# Patient Record
Sex: MALE | Race: WHITE | HISPANIC OR LATINO | Employment: FULL TIME | URBAN - METROPOLITAN AREA
[De-identification: names, ages, dates, MRNs, and addresses within clinical notes are randomized per-mention and may not be internally consistent; named-entity substitution may affect disease eponyms.]

---

## 2017-02-06 ENCOUNTER — OFFICE VISIT (OUTPATIENT)
Dept: URGENT CARE | Facility: CLINIC | Age: 47
End: 2017-02-06
Payer: COMMERCIAL

## 2017-02-06 VITALS
TEMPERATURE: 97.5 F | SYSTOLIC BLOOD PRESSURE: 120 MMHG | RESPIRATION RATE: 20 BRPM | WEIGHT: 178 LBS | OXYGEN SATURATION: 96 % | BODY MASS INDEX: 26.27 KG/M2 | HEART RATE: 78 BPM | DIASTOLIC BLOOD PRESSURE: 80 MMHG

## 2017-02-06 DIAGNOSIS — M54.50 LUMBOSACRAL PAIN: Primary | ICD-10-CM

## 2017-02-06 PROCEDURE — 99214 OFFICE O/P EST MOD 30 MIN: CPT | Performed by: PHYSICIAN ASSISTANT

## 2017-02-06 RX ORDER — TRAMADOL HYDROCHLORIDE 50 MG/1
50 TABLET ORAL EVERY 4 HOURS PRN
Qty: 30 TAB | Refills: 0 | Status: SHIPPED | OUTPATIENT
Start: 2017-02-06 | End: 2017-08-22

## 2017-02-06 RX ORDER — METHYLPREDNISOLONE 4 MG/1
TABLET ORAL
Qty: 21 TAB | Refills: 0 | Status: SHIPPED | OUTPATIENT
Start: 2017-02-06 | End: 2017-02-12

## 2017-02-06 RX ORDER — CYCLOBENZAPRINE HCL 5 MG
5-10 TABLET ORAL 3 TIMES DAILY PRN
Qty: 30 TAB | Refills: 0 | Status: SHIPPED | OUTPATIENT
Start: 2017-02-06 | End: 2017-08-22

## 2017-02-06 RX ORDER — IBUPROFEN 200 MG
200 TABLET ORAL EVERY 6 HOURS PRN
COMMUNITY
End: 2017-08-22

## 2017-02-06 ASSESSMENT — ENCOUNTER SYMPTOMS: BACK PAIN: 1

## 2017-02-06 NOTE — MR AVS SNAPSHOT
Darrell Latham   2017 11:15 AM   Office Visit   MRN: [id number]    Department:  [facility]   Dept Phone:  [phone]    Description:  Male : 1970   Provider:  Chang Ramírez PA-C           Reason for Visit     Back Pain Since yesterday hurt lower back .      Allergies as of 2017     Allergen Noted Reactions    Latex 2016       rash    Seasonal 2016   Itching    Sinus pressure, itchy eyes, itchy throat      You were diagnosed with     Lumbosacral pain   [395146]  -  Primary       Vital Signs     Blood Pressure Pulse Temperature Respirations Weight Oxygen Saturation    120/80 mmHg 78 36.4 °C (97.5 °F) 20 80.74 kg (178 lb) 96%    Smoking Status                   Never Smoker            Basic Information     Date Of Birth Sex Race Ethnicity Preferred Language    1970 Male White Non- English      Problem List              ICD-10-CM Priority Class Noted - Resolved    Hyponatremia E87.1   2016 - Present    Chest pain R07.9   2016 - Present      Health Maintenance        Date Due Completion Dates    IMM DTaP/Tdap/Td Vaccine (1 - Tdap) 2010    IMM INFLUENZA (1) 2016 ---            Current Immunizations     TD Vaccine 2010      Below and/or attached are the medications your provider expects you to take. Review all of your home medications and newly ordered medications with your provider and/or pharmacist. Follow medication instructions as directed by your provider and/or pharmacist. Please keep your medication list with you and share with your provider. Update the information when medications are discontinued, doses are changed, or new medications (including over-the-counter products) are added; and carry medication information at all times in the event of emergency situations     Allergies:  LATEX - (reactions not documented)     SEASONAL - Itching               Medications  Valid as of: 2017 - 11:40 AM    Generic Name  Brand Name Tablet Size Instructions for use    Acetaminophen (Tab) TYLENOL 500 MG Take 1,000 mg by mouth every 6 hours as needed for Mild Pain.        Cyclobenzaprine HCl (Tab) FLEXERIL 5 MG Take 1-2 Tabs by mouth 3 times a day as needed.        Fluticasone Propionate (Suspension) FLONASE 50 MCG/ACT Spray 1 Spray in nose every day. Each Nostril        Ibuprofen (Tab) MOTRIN 200 MG Take 200 mg by mouth every 6 hours as needed.        MethylPREDNISolone (Tablet Therapy Pack) MEDROL DOSEPAK 4 MG Use as directed        Multiple Vitamins-Minerals (Tab) THERAGRAN-M  Take 1 Tab by mouth every day.        Pseudoephedrine HCl   Take 1 Tab by mouth every day.        Temazepam (Cap) RESTORIL 7.5 MG Take 7.5 mg by mouth at bedtime as needed for Sleep.        TraMADol HCl (Tab) ULTRAM 50 MG Take 1 Tab by mouth every four hours as needed.        .                 Medicines prescribed today were sent to:     [facility][id number] - [place] [address] [facility]    [address] [place] [address]    Phone: [phone] Fax: [phone]    Open 24 Hours?: No      Medication refill instructions:       If your prescription bottle indicates you have medication refills left, it is not necessary to call your provider’s office. Please contact your pharmacy and they will refill your medication.    If your prescription bottle indicates you do not have any refills left, you may request refills at any time through one of the following ways: The online [employer] system (except Urgent Care), by calling your provider’s office, or by asking your pharmacy to contact your provider’s office with a refill request. Medication refills are processed only during regular business hours and may not be available until the next business day. Your provider may request additional information or to have a follow-up visit with you prior to refilling your medication.   *Please Note: Medication refills are assigned a new Rx number when refilled electronically. Your  pharmacy may indicate that no refills were authorized even though a new prescription for the same medication is available at the pharmacy. Please request the medicine by name with the pharmacy before contacting your provider for a refill.        Instructions    Lumbosacral Strain  Lumbosacral strain is a strain of any of the parts that make up your lumbosacral vertebrae. Your lumbosacral vertebrae are the bones that make up the lower third of your backbone. Your lumbosacral vertebrae are held together by muscles and tough, fibrous tissue (ligaments).   CAUSES   A sudden blow to your back can cause lumbosacral strain. Also, anything that causes an excessive stretch of the muscles in the low back can cause this strain. This is typically seen when people exert themselves strenuously, fall, lift heavy objects, bend, or crouch repeatedly.  RISK FACTORS  · Physically demanding work.  · Participation in pushing or pulling sports or sports that require a sudden twist of the back (tennis, golf, baseball).  · Weight lifting.  · Excessive lower back curvature.  · Forward-tilted pelvis.  · Weak back or abdominal muscles or both.  · Tight hamstrings.  SIGNS AND SYMPTOMS   Lumbosacral strain may cause pain in the area of your injury or pain that moves (radiates) down your leg.   DIAGNOSIS  Your health care provider can often diagnose lumbosacral strain through a physical exam. In some cases, you may need tests such as X-ray exams.   TREATMENT   Treatment for your lower back injury depends on many factors that your clinician will have to evaluate. However, most treatment will include the use of anti-inflammatory medicines.  HOME CARE INSTRUCTIONS   · Avoid hard physical activities (tennis, racquetball, waterskiing) if you are not in proper physical condition for it. This may aggravate or create problems.  · If you have a back problem, avoid sports requiring sudden body movements. Swimming and walking are generally safer  activities.  · Maintain good posture.  · Maintain a healthy weight.  · For acute conditions, you may put ice on the injured area.  ¨ Put ice in a plastic bag.  ¨ Place a towel between your skin and the bag.  ¨ Leave the ice on for 20 minutes, 2-3 times a day.  · When the low back starts healing, stretching and strengthening exercises may be recommended.  SEEK MEDICAL CARE IF:  · Your back pain is getting worse.  · You experience severe back pain not relieved with medicines.  SEEK IMMEDIATE MEDICAL CARE IF:   · You have numbness, tingling, weakness, or problems with the use of your arms or legs.  · There is a change in bowel or bladder control.  · You have increasing pain in any area of the body, including your belly (abdomen).  · You notice shortness of breath, dizziness, or feel faint.  · You feel sick to your stomach (nauseous), are throwing up (vomiting), or become sweaty.  · You notice discoloration of your toes or legs, or your feet get very cold.  MAKE SURE YOU:   · Understand these instructions.  · Will watch your condition.  · Will get help right away if you are not doing well or get worse.     This information is not intended to replace advice given to you by your health care provider. Make sure you discuss any questions you have with your health care provider.     Document Released: 09/27/2006 Document Revised: 01/08/2016 Document Reviewed: 08/06/2014  [employer] Interactive Patient Education ©2016 Elsevier Inc.            Prifloat Access Code: [email or web address]  Expires: 3/8/2017 11:30 AM    Your email address is not on file at [email or web address].  Email Addresses are required for you to sign up for Prifloat, please contact [phone] to verify your personal information and to provide your email address prior to attempting to register for Prifloat.    Darrell Latham  [address] [place] [address]    Prifloat  A secure, online tool to manage your health information     [employer]’s Prifloat® is a secure,  online tool that connects you to your personalized health information from the privacy of your home -- day or night - making it very easy for you to manage your healthcare. Once the activation process is completed, you can even access your medical information using the Ringostat isiah, which is available for free in the Apple Isiah store or Google Play store.     To learn more about Ringostat, visit [email or web address]    There are two levels of access available (as shown below):   My Chart Features  [facility]  Specialists [facility]  Urgent  Care Non-[facility] Primary Care Doctor   Email your healthcare team securely and privately 24/7 X X X    Manage appointments: schedule your next appointment; view details of past/upcoming appointments X      Request prescription refills. X      View recent personal medical records, including lab and immunizations X X X X   View health record, including health history, allergies, medications X X X X   Read reports about your outpatient visits, procedures, consult and ER notes X X X X   See your discharge summary, which is a recap of your hospital and/or ER visit that includes your diagnosis, lab results, and care plan X X  X     How to register for Ringostat:  Once your e-mail address has been verified, follow the following steps to sign up for Ringostat.     1. Go to  [email or web address]  2. Click on the Sign Up Now box, which takes you to the New Member Sign Up page. You will need to provide the following information:  a. Enter your Ringostat Access Code exactly as it appears at the top of this page. (You will not need to use this code after you’ve completed the sign-up process. If you do not sign up before the expiration date, you must request a new code.)   b. Enter your date of birth.   c. Enter your home email address.   d. Click Submit, and follow the next screen’s instructions.  3. Create a Ringostat ID. This will be your Ringostat login ID and cannot be  changed, so think of one that is secure and easy to remember.  4. Create a StarCard password. You can change your password at any time.  5. Enter your Password Reset Question and Answer. This can be used at a later time if you forget your password.   6. Enter your e-mail address. This allows you to receive e-mail notifications when new information is available in StarCard.  7. Click Sign Up. You can now view your health information.    For assistance activating your StarCard account, call [phone]

## 2017-02-06 NOTE — PROGRESS NOTES
"Subjective:      PT is a 46 y.o. male who presents with Back Pain            Back Pain  This is a new problem. The current episode started yesterday. The problem occurs constantly. The problem has been gradually worsening since onset. The pain is present in the lumbar spine. The quality of the pain is described as aching, burning and shooting. The pain does not radiate. The pain is at a severity of 8/10. The pain is moderate. The pain is the same all the time. The symptoms are aggravated by position. Stiffness is present all day. He has tried home exercises, heat, ice and NSAIDs for the symptoms. The treatment provided no relief.   PT states he was vacuuming before the Super Bowl yesterday and felt a \"pull\" in his right lumbar region with instant pain. PT denies issues with bowel or bladder incontinence. Pt has not taken any Rx medications for this condition. Pt states the pain is a 7/10, aching in nature and worse at night. Pt denies CP, SOB, NVD, paresthesias, headaches, dizziness, change in vision, hives, or other joint pain. The pt's medication list, problem list, and allergies have been evaluated and reviewed during today's visit.    PMH:  Negative per pt.      PSH:  Negative per pt.      Fam Hx:  Father with hx of HTN      Soc HX:  Social History     Social History   • Marital Status:      Spouse Name: N/A   • Number of Children: N/A   • Years of Education: N/A     Occupational History   • Not on file.     Social History Main Topics   • Smoking status: Never Smoker    • Smokeless tobacco: Not on file   • Alcohol Use: No   • Drug Use: No   • Sexual Activity: Not on file     Other Topics Concern   • Not on file     Social History Narrative         Medications:    Current outpatient prescriptions:   •  ibuprofen (MOTRIN) 200 MG Tab, Take 200 mg by mouth every 6 hours as needed., Disp: , Rfl:   •  tramadol (ULTRAM) 50 MG Tab, Take 1 Tab by mouth every four hours as needed., Disp: 30 Tab, Rfl: 0  •  " cyclobenzaprine (FLEXERIL) 5 MG tablet, Take 1-2 Tabs by mouth 3 times a day as needed., Disp: 30 Tab, Rfl: 0  •  MethylPREDNISolone (MEDROL DOSEPAK) 4 MG Tablet Therapy Pack, Use as directed, Disp: 21 Tab, Rfl: 0  •  Pseudoephedrine HCl (SUDAFED 24 HOUR PO), Take 1 Tab by mouth every day., Disp: , Rfl:   •  therapeutic multivitamin-minerals (THERAGRAN-M) Tab, Take 1 Tab by mouth every day., Disp: , Rfl:   •  acetaminophen (TYLENOL) 500 MG Tab, Take 1,000 mg by mouth every 6 hours as needed for Mild Pain., Disp: , Rfl:   •  temazepam (RESTORIL) 7.5 MG capsule, Take 7.5 mg by mouth at bedtime as needed for Sleep., Disp: , Rfl:   •  fluticasone (FLONASE) 50 MCG/ACT nasal spray, Spray 1 Spray in nose every day. Each Nostril, Disp: , Rfl:       Allergies:  Latex and Seasonal        Review of Systems   Musculoskeletal: Positive for back pain.   Constitutional: Negative for fever, chills and malaise/fatigue.   HENT: Negative for congestion and sore throat.    Eyes: Negative for blurred vision, double vision and photophobia.   Respiratory: Negative for cough and shortness of breath.    Cardiovascular: Negative for chest pain and palpitations.   Gastrointestinal: Negative for heartburn, nausea, vomiting, abdominal pain, diarrhea and constipation.   Genitourinary: Negative for dysuria and flank pain.   Skin: Negative for itching and rash.   Neurological: Negative for dizziness, tingling and headaches.   Endo/Heme/Allergies: Does not bruise/bleed easily.   Psychiatric/Behavioral: Negative for depression. The patient is not nervous/anxious.             Objective:     /80 mmHg  Pulse 78  Temp(Src) 36.4 °C (97.5 °F)  Resp 20  Wt 80.74 kg (178 lb)  SpO2 96%     Physical Exam   Musculoskeletal:        Lumbar back: He exhibits decreased range of motion, tenderness, pain and spasm. He exhibits no bony tenderness, no swelling, no edema, no deformity, no laceration and normal pulse.        Back:          Constitutional: PT  is oriented to person, place, and time. PT appears well-developed and well-nourished. No distress.   HENT:   Head: Normocephalic and atraumatic.   Mouth/Throat: Oropharynx is clear and moist. No oropharyngeal exudate.   Eyes: Conjunctivae normal and EOM are normal. Pupils are equal, round, and reactive to light.   Neck: Normal range of motion. Neck supple. No thyromegaly present.   Cardiovascular: Normal rate, regular rhythm, normal heart sounds and intact distal pulses.  Exam reveals no gallop and no friction rub.    No murmur heard.  Pulmonary/Chest: Effort normal and breath sounds normal. No respiratory distress. PT has no wheezes. PT has no rales. Pt exhibits no tenderness.   Abdominal: Soft. Bowel sounds are normal. PT exhibits no distension and no mass. There is no tenderness. There is no rebound and no guarding.   Neurological: PT is alert and oriented to person, place, and time. PT has normal reflexes. No cranial nerve deficit.   Skin: Skin is warm and dry. No rash noted. PT is not diaphoretic. No erythema.       Psychiatric: PT has a normal mood and affect. PT behavior is normal. Judgment and thought content normal.          Assessment/Plan:     1. Lumbosacral pain    - tramadol (ULTRAM) 50 MG Tab; Take 1 Tab by mouth every four hours as needed.  Dispense: 30 Tab; Refill: 0  - cyclobenzaprine (FLEXERIL) 5 MG tablet; Take 1-2 Tabs by mouth 3 times a day as needed.  Dispense: 30 Tab; Refill: 0  - MethylPREDNISolone (MEDROL DOSEPAK) 4 MG Tablet Therapy Pack; Use as directed  Dispense: 21 Tab; Refill: 0    [facility] Aware web site evaluation: I have obtained and reviewed patient utilization report from [facility] pharmacy database prior to writing prescription for controlled substance II, III or IV per [place] bill . Based on the report and my clinical assessment the prescription is medically necessary.   NSAIDs for pain 1-5, Ultram for pain 6-10 or to help get to sleep.  RICE therapy discussed  Gentle  ROM exercises discussed  WBAT BLE  Ice/heat therapy discussed  Rest, fluids encouraged.  AVS with medical info given.  Pt was in full understanding and agreement with the plan.  Follow-up as needed if symptoms worsen or fail to improve.

## 2017-02-06 NOTE — PATIENT INSTRUCTIONS
Lumbosacral Strain  Lumbosacral strain is a strain of any of the parts that make up your lumbosacral vertebrae. Your lumbosacral vertebrae are the bones that make up the lower third of your backbone. Your lumbosacral vertebrae are held together by muscles and tough, fibrous tissue (ligaments).   CAUSES   A sudden blow to your back can cause lumbosacral strain. Also, anything that causes an excessive stretch of the muscles in the low back can cause this strain. This is typically seen when people exert themselves strenuously, fall, lift heavy objects, bend, or crouch repeatedly.  RISK FACTORS  · Physically demanding work.  · Participation in pushing or pulling sports or sports that require a sudden twist of the back (tennis, golf, baseball).  · Weight lifting.  · Excessive lower back curvature.  · Forward-tilted pelvis.  · Weak back or abdominal muscles or both.  · Tight hamstrings.  SIGNS AND SYMPTOMS   Lumbosacral strain may cause pain in the area of your injury or pain that moves (radiates) down your leg.   DIAGNOSIS  Your health care provider can often diagnose lumbosacral strain through a physical exam. In some cases, you may need tests such as X-ray exams.   TREATMENT   Treatment for your lower back injury depends on many factors that your clinician will have to evaluate. However, most treatment will include the use of anti-inflammatory medicines.  HOME CARE INSTRUCTIONS   · Avoid hard physical activities (tennis, racquetball, waterskiing) if you are not in proper physical condition for it. This may aggravate or create problems.  · If you have a back problem, avoid sports requiring sudden body movements. Swimming and walking are generally safer activities.  · Maintain good posture.  · Maintain a healthy weight.  · For acute conditions, you may put ice on the injured area.  ¨ Put ice in a plastic bag.  ¨ Place a towel between your skin and the bag.  ¨ Leave the ice on for 20 minutes, 2-3 times a day.  · When the  low back starts healing, stretching and strengthening exercises may be recommended.  SEEK MEDICAL CARE IF:  · Your back pain is getting worse.  · You experience severe back pain not relieved with medicines.  SEEK IMMEDIATE MEDICAL CARE IF:   · You have numbness, tingling, weakness, or problems with the use of your arms or legs.  · There is a change in bowel or bladder control.  · You have increasing pain in any area of the body, including your belly (abdomen).  · You notice shortness of breath, dizziness, or feel faint.  · You feel sick to your stomach (nauseous), are throwing up (vomiting), or become sweaty.  · You notice discoloration of your toes or legs, or your feet get very cold.  MAKE SURE YOU:   · Understand these instructions.  · Will watch your condition.  · Will get help right away if you are not doing well or get worse.     This information is not intended to replace advice given to you by your health care provider. Make sure you discuss any questions you have with your health care provider.     Document Released: 09/27/2006 Document Revised: 01/08/2016 Document Reviewed: 08/06/2014  [employer] Interactive Patient Education ©2016 [employer].

## 2017-08-22 ENCOUNTER — HOSPITAL ENCOUNTER (EMERGENCY)
Facility: MEDICAL CENTER | Age: 47
End: 2017-08-22
Attending: EMERGENCY MEDICINE
Payer: COMMERCIAL

## 2017-08-22 ENCOUNTER — APPOINTMENT (OUTPATIENT)
Dept: RADIOLOGY | Facility: MEDICAL CENTER | Age: 47
End: 2017-08-22
Attending: EMERGENCY MEDICINE
Payer: COMMERCIAL

## 2017-08-22 VITALS
DIASTOLIC BLOOD PRESSURE: 112 MMHG | RESPIRATION RATE: 13 BRPM | WEIGHT: 168.21 LBS | HEART RATE: 57 BPM | BODY MASS INDEX: 24.91 KG/M2 | TEMPERATURE: 98.5 F | HEIGHT: 69 IN | OXYGEN SATURATION: 94 % | SYSTOLIC BLOOD PRESSURE: 149 MMHG

## 2017-08-22 DIAGNOSIS — R10.13 EPIGASTRIC PAIN: ICD-10-CM

## 2017-08-22 LAB
ALBUMIN SERPL BCP-MCNC: 4.4 G/DL (ref 3.2–4.9)
ALBUMIN/GLOB SERPL: 1.8 G/DL
ALP SERPL-CCNC: 81 U/L (ref 30–99)
ALT SERPL-CCNC: 23 U/L (ref 2–50)
ANION GAP SERPL CALC-SCNC: 7 MMOL/L (ref 0–11.9)
AST SERPL-CCNC: 25 U/L (ref 12–45)
BASOPHILS # BLD AUTO: 0.5 % (ref 0–1.8)
BASOPHILS # BLD: 0.03 K/UL (ref 0–0.12)
BILIRUB SERPL-MCNC: 1.5 MG/DL (ref 0.1–1.5)
BUN SERPL-MCNC: 10 MG/DL (ref 8–22)
CALCIUM SERPL-MCNC: 9.4 MG/DL (ref 8.4–10.2)
CHLORIDE SERPL-SCNC: 99 MMOL/L (ref 96–112)
CO2 SERPL-SCNC: 26 MMOL/L (ref 20–33)
CREAT SERPL-MCNC: 0.97 MG/DL (ref 0.5–1.4)
DEPRECATED D DIMER PPP IA-ACNC: 222 NG/ML(D-DU)
EKG IMPRESSION: NORMAL
EOSINOPHIL # BLD AUTO: 0.02 K/UL (ref 0–0.51)
EOSINOPHIL NFR BLD: 0.3 % (ref 0–6.9)
ERYTHROCYTE [DISTWIDTH] IN BLOOD BY AUTOMATED COUNT: 40.8 FL (ref 35.9–50)
GFR SERPL CREATININE-BSD FRML MDRD: >60 ML/MIN/1.73 M 2
GLOBULIN SER CALC-MCNC: 2.4 G/DL (ref 1.9–3.5)
GLUCOSE SERPL-MCNC: 112 MG/DL (ref 65–99)
HCT VFR BLD AUTO: 44.8 % (ref 42–52)
HGB BLD-MCNC: 16.2 G/DL (ref 14–18)
IMM GRANULOCYTES # BLD AUTO: 0.02 K/UL (ref 0–0.11)
IMM GRANULOCYTES NFR BLD AUTO: 0.3 % (ref 0–0.9)
LIPASE SERPL-CCNC: 20 U/L (ref 7–58)
LYMPHOCYTES # BLD AUTO: 1.35 K/UL (ref 1–4.8)
LYMPHOCYTES NFR BLD: 22.2 % (ref 22–41)
MCH RBC QN AUTO: 31.9 PG (ref 27–33)
MCHC RBC AUTO-ENTMCNC: 36.2 G/DL (ref 33.7–35.3)
MCV RBC AUTO: 88.2 FL (ref 81.4–97.8)
MONOCYTES # BLD AUTO: 0.41 K/UL (ref 0–0.85)
MONOCYTES NFR BLD AUTO: 6.7 % (ref 0–13.4)
NEUTROPHILS # BLD AUTO: 4.26 K/UL (ref 1.82–7.42)
NEUTROPHILS NFR BLD: 70 % (ref 44–72)
NRBC # BLD AUTO: 0 K/UL
NRBC BLD AUTO-RTO: 0 /100 WBC
PLATELET # BLD AUTO: 219 K/UL (ref 164–446)
PMV BLD AUTO: 8.7 FL (ref 9–12.9)
POTASSIUM SERPL-SCNC: 3.6 MMOL/L (ref 3.6–5.5)
PROT SERPL-MCNC: 6.8 G/DL (ref 6–8.2)
RBC # BLD AUTO: 5.08 M/UL (ref 4.7–6.1)
SODIUM SERPL-SCNC: 132 MMOL/L (ref 135–145)
TROPONIN I SERPL-MCNC: <0.02 NG/ML (ref 0–0.04)
WBC # BLD AUTO: 6.1 K/UL (ref 4.8–10.8)

## 2017-08-22 PROCEDURE — 80053 COMPREHEN METABOLIC PANEL: CPT

## 2017-08-22 PROCEDURE — 71010 DX-CHEST-PORTABLE (1 VIEW): CPT

## 2017-08-22 PROCEDURE — 700102 HCHG RX REV CODE 250 W/ 637 OVERRIDE(OP): Performed by: EMERGENCY MEDICINE

## 2017-08-22 PROCEDURE — 84484 ASSAY OF TROPONIN QUANT: CPT

## 2017-08-22 PROCEDURE — A9270 NON-COVERED ITEM OR SERVICE: HCPCS | Performed by: EMERGENCY MEDICINE

## 2017-08-22 PROCEDURE — 85025 COMPLETE CBC W/AUTO DIFF WBC: CPT

## 2017-08-22 PROCEDURE — 76705 ECHO EXAM OF ABDOMEN: CPT

## 2017-08-22 PROCEDURE — 99285 EMERGENCY DEPT VISIT HI MDM: CPT

## 2017-08-22 PROCEDURE — 93005 ELECTROCARDIOGRAM TRACING: CPT | Performed by: EMERGENCY MEDICINE

## 2017-08-22 PROCEDURE — 85379 FIBRIN DEGRADATION QUANT: CPT

## 2017-08-22 PROCEDURE — 36415 COLL VENOUS BLD VENIPUNCTURE: CPT

## 2017-08-22 PROCEDURE — 83690 ASSAY OF LIPASE: CPT

## 2017-08-22 RX ADMIN — LIDOCAINE HYDROCHLORIDE 30 ML: 20 SOLUTION OROPHARYNGEAL at 08:54

## 2017-08-22 ASSESSMENT — PAIN SCALES - GENERAL: PAINLEVEL_OUTOF10: 6

## 2017-08-22 NOTE — ED AVS SNAPSHOT
Pressable Access Code: [email or web address]  Expires: 9/21/2017 11:05 AM    Your email address is not on file at [facility].  Email Addresses are required for you to sign up for Pressable, please contact [phone] to verify your personal information and to provide your email address prior to attempting to register for Pressable.    Darrell Latham  [address] [place] [address]    Pressable  A secure, online tool to manage your health information     [employer]’s Pressable® is a secure, online tool that connects you to your personalized health information from the privacy of your home -- day or night - making it very easy for you to manage your healthcare. Once the activation process is completed, you can even access your medical information using the Pressable isiah, which is available for free in the Apple Isiah store or Google Play store.     To learn more about Pressable, visit [email or web address]    There are two levels of access available (as shown below):   My Chart Features  [facility] Primary Care Doctor [facility]  Specialists [facility]  Urgent  Care Non-[facility] Primary Care Doctor   Email your healthcare team securely and privately 24/7 X X X    Manage appointments: schedule your next appointment; view details of past/upcoming appointments X      Request prescription refills. X      View recent personal medical records, including lab and immunizations X X X X   View health record, including health history, allergies, medications X X X X   Read reports about your outpatient visits, procedures, consult and ER notes X X X X   See your discharge summary, which is a recap of your hospital and/or ER visit that includes your diagnosis, lab results, and care plan X X  X     How to register for Pressable:  Once your e-mail address has been verified, follow the following steps to sign up for Pressable.     1. Go to  [email or web address]  2. Click on the Sign Up Now box, which takes you to the New Member Sign Up page. You will  need to provide the following information:  a. Enter your [employer] Access Code exactly as it appears at the top of this page. (You will not need to use this code after you’ve completed the sign-up process. If you do not sign up before the expiration date, you must request a new code.)   b. Enter your date of birth.   c. Enter your home email address.   d. Click Submit, and follow the next screen’s instructions.  3. Create a Websenset ID. This will be your [employer] login ID and cannot be changed, so think of one that is secure and easy to remember.  4. Create a [employer] password. You can change your password at any time.  5. Enter your Password Reset Question and Answer. This can be used at a later time if you forget your password.   6. Enter your e-mail address. This allows you to receive e-mail notifications when new information is available in [employer].  7. Click Sign Up. You can now view your health information.    For assistance activating your [employer] account, call [phone]

## 2017-08-22 NOTE — ED PROVIDER NOTES
ED Provider Note    CHIEF COMPLAINT  Chief Complaint   Patient presents with   • Epigastric Pain   • RUQ Pain   • Back Pain       HPI  Darrell Latham is a 47 y.o. male who presents to the Emergency Department with pain in epigastric region during the last few months.   Patient did endoscopy and colonoscopy last month.  Scheduled to have gallbladder evaluated on Friday.    Pain going into the chest last night with difficulty breathing this morning. He states he had the chest radiate to his chest around 2 am. He has had Dr. Fernandez do labs starting a few months ago.  Last year he was hospitalized and had a normal stress test and echo.   He was constipated last night and is not usually constipated.  He states he flew to [place] in May and no long trips after that.   He denies any pain or swelling in his legs.  No history of blot clots.  Pain in epigastric region seems worse at night.   Taking temazepam to sleep.  He states Dr. Ash from [facility], placed him on Omeprazole, he felt fine for a few weeks and then the pain came back more intense than before so he stopped taking it.         REVIEW OF SYSTEMS  Positive for abdominal pain, chest pain, shortness of breath, Negative for fever.  As above all other systems are negative.    PAST MEDICAL HISTORY   has no past medical history on file.    FAMILY HISTORY  History reviewed. No pertinent family history.     SOCIAL HISTORY  Social History     Social History Main Topics   • Smoking status: Never Smoker    • Smokeless tobacco: Not on file   • Alcohol Use: No   • Drug Use: No   • Sexual Activity: Not on file       SURGICAL HISTORY  patient denies any surgical history    CURRENT MEDICATIONS  Reviewed.  See Encounter Summary.  Include No current facility-administered medications for this encounter.    Current outpatient prescriptions:   •  therapeutic multivitamin-minerals (THERAGRAN-M) Tab, Take 1 Tab by mouth every day., Disp: , Rfl:   •  temazepam  "(RESTORIL) 7.5 MG capsule, Take 7.5 mg by mouth at bedtime as needed for Sleep., Disp: , Rfl:       ALLERGIES  Allergies   Allergen Reactions   • Latex      rash   • Seasonal Itching     Sinus pressure, itchy eyes, itchy throat       PHYSICAL EXAM  VITAL SIGNS: /92 mmHg  Pulse 77  Temp(Src) 36.9 °C (98.5 °F)  Resp 18  Ht 1.753 m (5' 9.02\")  Wt 76.3 kg (168 lb 3.4 oz)  BMI 24.83 kg/m2  SpO2 96%  Constitutional:  Anxious, able to answer questions  HENT: Nose is normal in appearance, external ears are normal,  moist mucous membranes  Eyes: Anicteric,  pupils are equal round and reactive, there is no conjunctival drainage or pallor   Neck: The trachea is midline, there is no obvious mass or meningeal signs  Cardiovascular: Good perfusion,  regular rate and rhythm without murmurs gallops or rubs  Thorax & Lungs: Respiratory rate and effort are normal. There is normal chest excursion with respiration.  No wheezes rhonchi or rales noted.  Abdomen: Abdomen is normal in appearance, no gross peritoneal signs  normal bowel sounds, no pain with cough  :   No CVA tenderness to palpation  Musculoskeletal: No deformities noted in all 4 extremities.   Skin: Visualized skin is warm without rash.  Neurologic:  Cranial nerves II through XII are intact there is no focal abnormality noted.  Psychiatric: Normal mood and mentation    RADIOLOGY/PROCEDURES  Imaging Studies:    US-GALLBLADDER   Final Result      Unremarkable gallbladder ultrasound.         DX-CHEST-PORTABLE (1 VIEW)   Final Result      No radiographic evidence of acute cardiopulmonary process.            Pertinent Labs   Results for orders placed or performed during the hospital encounter of 08/22/17   CBC w/ Differential   Result Value Ref Range    WBC 6.1 4.8 - 10.8 K/uL    RBC 5.08 4.70 - 6.10 M/uL    Hemoglobin 16.2 14.0 - 18.0 g/dL    Hematocrit 44.8 42.0 - 52.0 %    MCV 88.2 81.4 - 97.8 fL    MCH 31.9 27.0 - 33.0 pg    MCHC 36.2 (H) 33.7 - 35.3 g/dL    " RDW 40.8 35.9 - 50.0 fL    Platelet Count 219 164 - 446 K/uL    MPV 8.7 (L) 9.0 - 12.9 fL    Neutrophils-Polys 70.00 44.00 - 72.00 %    Lymphocytes 22.20 22.00 - 41.00 %    Monocytes 6.70 0.00 - 13.40 %    Eosinophils 0.30 0.00 - 6.90 %    Basophils 0.50 0.00 - 1.80 %    Immature Granulocytes 0.30 0.00 - 0.90 %    Nucleated RBC 0.00 /100 WBC    Neutrophils (Absolute) 4.26 1.82 - 7.42 K/uL    Lymphs (Absolute) 1.35 1.00 - 4.80 K/uL    Monos (Absolute) 0.41 0.00 - 0.85 K/uL    Eos (Absolute) 0.02 0.00 - 0.51 K/uL    Baso (Absolute) 0.03 0.00 - 0.12 K/uL    Immature Granulocytes (abs) 0.02 0.00 - 0.11 K/uL    NRBC (Absolute) 0.00 K/uL   Complete Metabolic Panel (CMP)   Result Value Ref Range    Sodium 132 (L) 135 - 145 mmol/L    Potassium 3.6 3.6 - 5.5 mmol/L    Chloride 99 96 - 112 mmol/L    Co2 26 20 - 33 mmol/L    Anion Gap 7.0 0.0 - 11.9    Glucose 112 (H) 65 - 99 mg/dL    Bun 10 8 - 22 mg/dL    Creatinine 0.97 0.50 - 1.40 mg/dL    Calcium 9.4 8.4 - 10.2 mg/dL    AST(SGOT) 25 12 - 45 U/L    ALT(SGPT) 23 2 - 50 U/L    Alkaline Phosphatase 81 30 - 99 U/L    Total Bilirubin 1.5 0.1 - 1.5 mg/dL    Albumin 4.4 3.2 - 4.9 g/dL    Total Protein 6.8 6.0 - 8.2 g/dL    Globulin 2.4 1.9 - 3.5 g/dL    A-G Ratio 1.8 g/dL   Troponin STAT   Result Value Ref Range    Troponin I <0.02 0.00 - 0.04 ng/mL   Lipase   Result Value Ref Range    Lipase 20 7 - 58 U/L   D-DIMER   Result Value Ref Range    D-Dimer Screen 222 <250 ng/mL(D-DU)   ESTIMATED GFR   Result Value Ref Range    GFR If African American >60 >60 mL/min/1.73 m 2    GFR If Non African American >60 >60 mL/min/1.73 m 2   EKG (ER)   Result Value Ref Range    Report       [facility] Emergency Dept.    Test Date:  2017  Pt Name:    CHANDA JUNG              Department: [facility]  MRN:        [id number]                      [facility]  Gender:     M                            Technician: [id number]  :        1970                    Requested By:DONATO HUMMEL  Order #:    [id number]                    Reading MD:    Measurements  Intervals                                Axis  Rate:       73                           P:          40  MD:         180                          QRS:        -13  QRSD:       96                           T:          15  QT:         380  QTc:        419    Interpretive Statements  SINUS RHYTHM  Compared to ECG 07/19/2016 10:52:34  No significant changes           EKG:   I interpreted this EKG myself.  This is a 12-lead study.  The rhythm is sinus with a rate of 73.  There are no ST segment nor T wave abnormalities.  Interpretation: No ST segment elevation myocardial infarction.      COURSE & MEDICAL DECISION MAKING  Nursing notes and vital signs were reviewed. (See chart for details)  The patients  records were reviewed, history was obtained from the patient and nursing;     The patient presents with epigastric pain for several months and last had an episode of pain into his chest with shortness of breath, and the differential diagnosis includes but is not limited to cholelithiasis, gastroesophageal reflux disease, esophagitis, less likely would be acute coronary syndrome secondary to recent negative stress test and negative echo, he does travel but is low risk otherwise for PE so I will do screening for pulmonary emboli although I think this is very unlikely.       Initial orders in the Emergency Department included CBC CMP troponin d-dimer chest x-ray and initial treatment in the Emergency Department included a GI cocktail and the patient received IV Hep-Lock    ED testing reveals normal labs including CBC CMP and lipase and a normal unremarkable ultrasound of his gallbladder and an unremarkable chest x-ray    At this time he has had a recent admission for stress testing which was negative he had a troponin which was negative after 6 hours of pain and he exercises frequently without getting chest discomfort I feel  that this is very progress for acute coronary syndrome. He is low risk for PE and his d-dimer was negative by per criteria I feel he is cleared and does not need CT imaging. He may have gallbladder dysfunction or reflux disease he is receiving GI and I've recommended he go back on his PPI and follow-up with them as an outpatient      FINAL IMPRESSION  1.  Epigastric abdominal pain  2. Atypical Chest pain       DISPOSITION  Home in stable condition    Patient has had high blood pressure while in the emergency department, felt likely secondary to medical condition. Counseled patient to monitor blood pressure at home and follow up with primary care physician.       FOLLOW UP:  Hayden Fernandez PA-C  [address] [facility] [facility]  [place] [address]  [phone]      Call for follow-up appointment      The patient was discharged home with an information sheet on epigastric pain and told to return immediately for any signs or symptoms listed, but specifically if feve, vomiting, or any worsening at all.  The patient verbally agreed to the discharge precautions and follow-up plan which is documented in EPIC.    Electronically signed by: Patsy Coats, 8/22/2017 8:34 AM

## 2017-08-22 NOTE — ED AVS SNAPSHOT
8/22/2017    Darrell Latham  [address] [place] [address]    Dear Darrell:    [facility] wants to ensure your discharge home is safe and you or your loved ones have had all of your questions answered regarding your care after you leave the hospital.    Below is a list of resources and contact information should you have any questions regarding your hospital stay, follow-up instructions, or active medical symptoms.    Questions or Concerns Regarding… Contact   Medical Questions Related to Your Discharge  (7 days a week, 8am-5pm) Contact a Nurse Care Coordinator   [phone]   Medical Questions Not Related to Your Discharge  (24 hours a day / 7 days a week)  Contact the Nurse Health Line   [phone]    Medications or Discharge Instructions Refer to your discharge packet   or contact your [facility] Primary Care Provider   [phone]   Follow-up Appointment(s) Schedule your appointment via [employer]   or contact Scheduling [phone]   Billing Review your statement via [employer]  or contact Billing [phone]   Medical Records Review your records via [employer]   or contact Medical Records [phone]     You may receive a telephone call within two days of discharge. This call is to make certain you understand your discharge instructions and have the opportunity to have any questions answered. You can also easily access your medical information, test results and upcoming appointments via the [employer] free online health management tool. You can learn more and sign up at [employer]/EnOcean. For assistance setting up your [employer] account, please call [phone].    Once again, we want to ensure your discharge home is safe and that you have a clear understanding of any next steps in your care. If you have any questions or concerns, please do not hesitate to contact us, we are here for you. Thank you for choosing [facility] for your healthcare needs.    Sincerely,    Your [facility] Healthcare Team

## 2017-08-22 NOTE — DISCHARGE INSTRUCTIONS
Pain Without a Known Cause  WHAT IS PAIN WITHOUT A KNOWN CAUSE?  Pain can occur in any part of the body and can range from mild to severe. Sometimes no cause can be found for why you are having pain. Some types of pain that can occur without a known cause include:   · Headache.  · Back pain.  · Abdominal pain.  · Neck pain.  HOW IS PAIN WITHOUT A KNOWN CAUSE DIAGNOSED?   Your health care provider will try to find the cause of your pain. This may include:  · Physical exam.  · Medical history.  · Blood tests.  · Urine tests.  · X-rays.  If no cause is found, your health care provider may diagnose you with pain without a known cause.   IS THERE TREATMENT FOR PAIN WITHOUT A CAUSE?   Treatment depends on the kind of pain you have. Your health care provider may prescribe medicines to help relieve your pain.   WHAT CAN I DO AT HOME FOR MY PAIN?   · Take medicines only as directed by your health care provider.  · Stop any activities that cause pain. During periods of severe pain, bed rest may help.  · Try to reduce your stress with activities such as yoga or meditation. Talk to your health care provider for other stress-reducing activity recommendations.  · Exercise regularly, if approved by your health care provider.  · Eat a healthy diet that includes fruits and vegetables. This may improve pain. Talk to your health care provider if you have any questions about your diet.  WHAT IF MY PAIN DOES NOT GET BETTER?   If you have a painful condition and no reason can be found for the pain or the pain gets worse, it is important to follow up with your health care provider. It may be necessary to repeat tests and look further for a possible cause.      This information is not intended to replace advice given to you by your health care provider. Make sure you discuss any questions you have with your health care provider.     Document Released: 09/12/2002 Document Revised: 01/08/2016 Document Reviewed: 05/05/2015  [place]  Interactive Patient Education ©2016 Elsevier Inc.

## 2017-08-22 NOTE — ED AVS SNAPSHOT
Home Care Instructions                                                                                                                Darrell Latham   MRN: [id number]    Department:  [facility], Emergency Dept   Date of Visit:  8/22/2017            [facility], Emergency Dept    [address] [place] [address]    Phone:  [phone]      You were seen by     Patsy Coats M.D.      Your Diagnosis Was     Epigastric pain     R10.13       These are the medications you received during your hospitalization from 08/22/2017 0821 to 08/22/2017 1105     Date/Time Order Dose Route Action    08/22/2017 0854 hyoscyamine-maalox plus-lidocaine viscous (GI COCKTAIL) oral susp 30 mL 30 mL Oral Given      Follow-up Information     1. Follow up with Hayden Fernandez PA-C.    Specialty:  Family Medicine    Why:  Call for follow-up appointment    Contact information    [address] [place] [id number] [phone]        Medication Information     Review all of your home medications and newly ordered medications with your primary doctor and/or pharmacist as soon as possible. Follow medication instructions as directed by your doctor and/or pharmacist.     Please keep your complete medication list with you and share with your physician. Update the information when medications are discontinued, doses are changed, or new medications (including over-the-counter products) are added; and carry medication information at all times in the event of emergency situations.               Medication List      ASK your doctor about these medications        Instructions    Morning Afternoon Evening Bedtime    temazepam 7.5 MG capsule   Commonly known as:  RESTORIL        Take 7.5 mg by mouth at bedtime as needed for Sleep.   Dose:  7.5 mg                        therapeutic multivitamin-minerals Tabs        Take 1 Tab by mouth every day.   Dose:  1 Tab                                   Procedures and tests performed during your visit     CBC w/ Differential    Complete Metabolic Panel (CMP)    D-DIMER    DX-CHEST-PORTABLE (1 VIEW)    EKG (ER)    ESTIMATED GFR    IV Saline Lock    Lipase    Troponin STAT    US-GALLBLADDER        Discharge Instructions       Pain Without a Known Cause  WHAT IS PAIN WITHOUT A KNOWN CAUSE?  Pain can occur in any part of the body and can range from mild to severe. Sometimes no cause can be found for why you are having pain. Some types of pain that can occur without a known cause include:   · Headache.  · Back pain.  · Abdominal pain.  · Neck pain.  HOW IS PAIN WITHOUT A KNOWN CAUSE DIAGNOSED?   Your health care provider will try to find the cause of your pain. This may include:  · Physical exam.  · Medical history.  · Blood tests.  · Urine tests.  · X-rays.  If no cause is found, your health care provider may diagnose you with pain without a known cause.   IS THERE TREATMENT FOR PAIN WITHOUT A CAUSE?   Treatment depends on the kind of pain you have. Your health care provider may prescribe medicines to help relieve your pain.   WHAT CAN I DO AT HOME FOR MY PAIN?   · Take medicines only as directed by your health care provider.  · Stop any activities that cause pain. During periods of severe pain, bed rest may help.  · Try to reduce your stress with activities such as yoga or meditation. Talk to your health care provider for other stress-reducing activity recommendations.  · Exercise regularly, if approved by your health care provider.  · Eat a healthy diet that includes fruits and vegetables. This may improve pain. Talk to your health care provider if you have any questions about your diet.  WHAT IF MY PAIN DOES NOT GET BETTER?   If you have a painful condition and no reason can be found for the pain or the pain gets worse, it is important to follow up with your health care provider. It may be necessary to repeat tests and look further for a possible cause.      This  information is not intended to replace advice given to you by your health care provider. Make sure you discuss any questions you have with your health care provider.     Document Released: 09/12/2002 Document Revised: 01/08/2016 Document Reviewed: 05/05/2015  Elsevier Interactive Patient Education ©2016 [employer]            Patient Information     Patient Information    Following emergency treatment: all patient requiring follow-up care must return either to a private physician or a clinic if your condition worsens before you are able to obtain further medical attention, please return to the emergency room.     Billing Information    At [facility], we work to make the billing process streamlined for our patients.  Our Representatives are here to answer any questions you may have regarding your hospital bill.  If you have insurance coverage and have supplied your insurance information to us, we will submit a claim to your insurer on your behalf.  Should you have any questions regarding your bill, we can be reached online or by phone as follows:  Online: You are able pay your bills online or live chat with our representatives about any billing questions you may have. We are here to help Monday - Friday from 8:00am to 7:30pm and 9:00am - 12:00pm on Saturdays.  Please visit [email or web address]  for more information.   Phone:  [phone] or [phone]    Please note that your emergency physician, surgeon, pathologist, radiologist, anesthesiologist, and other specialists are not employed by [facility] and will therefore bill separately for their services.  Please contact them directly for any questions concerning their bills at the numbers below:     Emergency Physician Services:  [phone]  [facility]:  [phone]  [facility]:  [phone]  [facility]:  [phone]    1. Your final bill may vary from the amount quoted  upon discharge if all procedures are not complete at that time, or if your doctor has additional procedures of which we are not aware. You will receive an additional bill if you return to the Emergency Department at [facility] for suture removal regardless of the facility of which the sutures were placed.     2. Please arrange for settlement of this account at the emergency registration.    3. All self-pay accounts are due in full at the time of treatment.  If you are unable to meet this obligation then payment is expected within 4-5 days.     4. If you have had radiology studies (CT, X-ray, Ultrasound, MRI), you have received a preliminary result during your emergency department visit. Please contact the radiology department [phone] to receive a copy of your final result. Please discuss the Final result with your primary physician or with the follow up physician provided.     Crisis Hotline:  [place] Crisis Hotline:  9-465-AGANULG or 1-582.956.1562  [place] Crisis Hotline:    1-508.119.6373 or 095-638-0491         ED Discharge Follow Up Questions    1. In order to provide you with very good care, we would like to follow up with a phone call in the next few days.  May we have your permission to contact you?     YES /  NO    2. What is the best phone number to call you? (       )_____-__________    3. What is the best time to call you?      Morning  /  Afternoon  /  Evening                   Patient Signature:  ____________________________________________________________    Date:  ____________________________________________________________      Your appointments     Sep 07, 2017  1:15 PM   PROCEDURE 15 with АННА Carter M.D.   PAIN MANAGEMENT  (--)    [address] [place] [id number]   [phone]           Your procedure is scheduled at Special Procedures at [facility] located at [address] [place] [address] just east of the main campus. Please check in at the front lobby desk 1 hour prior to your  appointment time. For your safety, please have a ride home with a responsible adult.

## 2017-09-07 ENCOUNTER — HOSPITAL ENCOUNTER (OUTPATIENT)
Dept: PAIN MANAGEMENT | Facility: REHABILITATION | Age: 47
End: 2017-09-07
Attending: PHYSICAL MEDICINE & REHABILITATION
Payer: COMMERCIAL

## 2017-09-07 ENCOUNTER — HOSPITAL ENCOUNTER (OUTPATIENT)
Dept: RADIOLOGY | Facility: REHABILITATION | Age: 47
End: 2017-09-07
Attending: PHYSICAL MEDICINE & REHABILITATION
Payer: COMMERCIAL

## 2017-09-07 VITALS
OXYGEN SATURATION: 96 % | HEART RATE: 76 BPM | SYSTOLIC BLOOD PRESSURE: 123 MMHG | TEMPERATURE: 98.1 F | DIASTOLIC BLOOD PRESSURE: 75 MMHG | RESPIRATION RATE: 16 BRPM

## 2017-09-07 PROCEDURE — 64491 INJ PARAVERT F JNT C/T 2 LEV: CPT

## 2017-09-07 PROCEDURE — 64490 INJ PARAVERT F JNT C/T 1 LEV: CPT

## 2017-09-07 PROCEDURE — 700111 HCHG RX REV CODE 636 W/ 250 OVERRIDE (IP)

## 2017-09-07 PROCEDURE — 700117 HCHG RX CONTRAST REV CODE 255

## 2017-09-07 RX ORDER — METHYLPREDNISOLONE ACETATE 80 MG/ML
INJECTION, SUSPENSION INTRA-ARTICULAR; INTRALESIONAL; INTRAMUSCULAR; SOFT TISSUE
Status: COMPLETED
Start: 2017-09-07 | End: 2017-09-07

## 2017-09-07 RX ORDER — BUPIVACAINE HYDROCHLORIDE 2.5 MG/ML
INJECTION, SOLUTION EPIDURAL; INFILTRATION; INTRACAUDAL
Status: COMPLETED
Start: 2017-09-07 | End: 2017-09-07

## 2017-09-07 RX ADMIN — METHYLPREDNISOLONE ACETATE 80 MG: 80 INJECTION, SUSPENSION INTRA-ARTICULAR; INTRALESIONAL; INTRAMUSCULAR; SOFT TISSUE at 13:18

## 2017-09-07 RX ADMIN — IOHEXOL 6 ML: 240 INJECTION, SOLUTION INTRATHECAL; INTRAVASCULAR; INTRAVENOUS; ORAL at 13:19

## 2017-09-07 ASSESSMENT — PAIN SCALES - GENERAL: PAINLEVEL_OUTOF10: 3

## 2017-09-07 NOTE — PROGRESS NOTES
Current medications reviewed with pt, see medications reconciliation form. Pt ramin taking ASA or other blood thinners or anti-inflammatories.  Pt has a ride post-procedure Danny, roommate  .  Printed and verbal discharge instructions given to pt who verbalized understanding.

## 2017-09-07 NOTE — PROCEDURES
Preoperative diagnosis:   cervical facetogenic pain C5-6 and C6-7    Postoperative diagnosis: Cervical facetogenic pain   C5-6 and C6-7    Procedure performed: Cervical facet blocks C5-6 and C6-7    Procedure patient was escorted to the procedure room they were placed  in a prone position. After giving full consent the area was then marked under fluoroscopic guidance for each facet. The patient was given light doses of IV sedation of Versed for anxiolytic properties as well as fentanyl for pain. That patient was monitored throughout the procedure with O2 sats vital signs and clinically. The area was then cleansed and prepped in a normal sterile fashion. The areas were then anesthetized at the marks spots after negative aspiration with half cc 1% lidocaine at each spot. Using the spinal needles they were introduced at each spot down to the appropriate facet ofC5-6 and C6-7  under fluoroscopic guidance. Utilizing Omnipaque after negative aspiration a quarter cc was then injected and good visualization of the facets were noted on fluoroscopy. The injectate of half cc of 1% lidocaine as well as 40 mg of Depo-Medrol was then injected after negative aspiration. The patient tolerated the procedure and noted the usual pain that they feel in that area. The needle was then retracted the paraspinal muscles utilizing a half cc of 1% lidocaine after negative aspiration the needle was then cleansed and removed completely the areas were then cleansed and a Band-Aid placed the patient was escorted into the postoperative room. They were monitored and hemodynamically stable and given postoperative instructions as well as conscious sedation instructions and  a followup appointment.    Drew Carter MD

## 2017-10-01 NOTE — PROCEDURES
DATE OF SERVICE:  09/07/2017    ADDENDUM    PREOPERATIVE DIAGNOSIS:  Bilateral cervical facet pain C5-C6, C6-C7.    POSTOPERATIVE DIAGNOSIS:  Bilateral facet pain, C5-C6, C6-C7.    PROCEDURE PERFORMED:  Bilateral cervical facet blocks C5-C6 and C6-C7.       ____________________________________     M MD JOSE MANCUSO / MERRY    DD:  09/28/2017 14:32:31  DT:  09/28/2017 19:39:33    D#:  [id number]  Job#:  [id number]

## 2018-02-05 ENCOUNTER — OFFICE VISIT (OUTPATIENT)
Dept: URGENT CARE | Facility: CLINIC | Age: 48
End: 2018-02-05
Payer: COMMERCIAL

## 2018-02-05 VITALS
TEMPERATURE: 98.6 F | OXYGEN SATURATION: 96 % | HEART RATE: 88 BPM | SYSTOLIC BLOOD PRESSURE: 130 MMHG | BODY MASS INDEX: 24.44 KG/M2 | DIASTOLIC BLOOD PRESSURE: 85 MMHG | WEIGHT: 165 LBS | HEIGHT: 69 IN | RESPIRATION RATE: 20 BRPM

## 2018-02-05 DIAGNOSIS — R05.9 COUGH: ICD-10-CM

## 2018-02-05 DIAGNOSIS — J01.00 ACUTE NON-RECURRENT MAXILLARY SINUSITIS: ICD-10-CM

## 2018-02-05 PROCEDURE — 99214 OFFICE O/P EST MOD 30 MIN: CPT | Performed by: PHYSICIAN ASSISTANT

## 2018-02-05 RX ORDER — PANTOPRAZOLE SODIUM 20 MG/1
20 TABLET, DELAYED RELEASE ORAL DAILY
COMMUNITY
End: 2018-02-27

## 2018-02-05 RX ORDER — AMOXICILLIN AND CLAVULANATE POTASSIUM 875; 125 MG/1; MG/1
1 TABLET, FILM COATED ORAL 2 TIMES DAILY
Qty: 20 TAB | Refills: 0 | Status: SHIPPED | OUTPATIENT
Start: 2018-02-05 | End: 2018-02-15

## 2018-02-05 RX ORDER — ALBUTEROL SULFATE 90 UG/1
2 AEROSOL, METERED RESPIRATORY (INHALATION) EVERY 4 HOURS PRN
Qty: 1 INHALER | Refills: 0 | Status: SHIPPED | OUTPATIENT
Start: 2018-02-05 | End: 2018-02-27

## 2018-02-05 RX ORDER — PROMETHAZINE HYDROCHLORIDE AND CODEINE PHOSPHATE 6.25; 1 MG/5ML; MG/5ML
5 SYRUP ORAL NIGHTLY PRN
Qty: 120 ML | Refills: 0 | Status: SHIPPED | OUTPATIENT
Start: 2018-02-05 | End: 2018-02-12

## 2018-02-05 ASSESSMENT — ENCOUNTER SYMPTOMS
CHILLS: 1
CARDIOVASCULAR NEGATIVE: 1
PSYCHIATRIC NEGATIVE: 1
EYES NEGATIVE: 1
MUSCULOSKELETAL NEGATIVE: 1
COUGH: 1
FEVER: 1
GASTROINTESTINAL NEGATIVE: 1
SINUS PAIN: 1
NEUROLOGICAL NEGATIVE: 1

## 2018-02-06 NOTE — PROGRESS NOTES
Subjective:      Darrell Latham is a 47 y.o. male who presents with Cough (Over a week stuffy nose, couple  days dry cough and chest congestion .)            HPI  Chief Complaint   Patient presents with   • Cough     Over a week stuffy nose, couple  days dry cough and chest congestion .       HPI:  Darrell Latham is a 47 y.o. male who presents with cough and stuffy nose for 1 week.  Travels for work and was in [place] recently (9 days ago).  Training for the [employer] and did try running,  WEnt into the chest therafter.  Try mucniex D.  Then was in [place] last week for work and worsenied.  Achy.  Small fever on and off.  Trouble sleeping.  Trying nyquil.  Burning in the chest.  Some wheezy.  No hx of asthma or bronchitis.  Coughing up mucous up until yesterday.  Mucous was dark yellow.  Also from nose, clear mucous initially.   No real sore throat.  Nyquil did not work.   Patient denies SOB, chest pain, palpitations, or n/v/d.      No past medical history on file.    No past surgical history on file.    No family history on file.  No pertinent family history.    Social History     Social History   • Marital status:      Spouse name: N/A   • Number of children: N/A   • Years of education: N/A     Occupational History   • Not on file.     Social History Main Topics   • Smoking status: Never Smoker   • Smokeless tobacco: Not on file   • Alcohol use No   • Drug use: No   • Sexual activity: Not on file     Other Topics Concern   • Not on file     Social History Narrative   • No narrative on file         Current Outpatient Prescriptions:   •  pantoprazole, 20 mg, Oral, DAILY, 2/5/2018  •  therapeutic multivitamin-minerals, 1 Tab, Oral, DAILY, 8/21/2017 at am  •  temazepam, 7.5 mg, Oral, HS PRN, 8/19/2017 at pm    Allergies   Allergen Reactions   • Latex      rash   • Seasonal Itching     Sinus pressure, itchy eyes, itchy throat        Review of Systems   Constitutional: Positive for chills and fever.  "  HENT: Positive for sinus pain.    Eyes: Negative.    Respiratory: Positive for cough.    Cardiovascular: Negative.    Gastrointestinal: Negative.    Genitourinary: Negative.    Musculoskeletal: Negative.    Neurological: Negative.    Endo/Heme/Allergies: Negative.    Psychiatric/Behavioral: Negative.           Objective:     /85   Pulse 88   Temp 37 °C (98.6 °F)   Resp 20   Ht 1.753 m (5' 9\")   Wt 74.8 kg (165 lb)   SpO2 96%   BMI 24.37 kg/m²      Physical Exam       Physical Exam   Nursing note and vitals reviewed.    Constitutional:   Appropriately groomed, pleasant affect, well nourished, in NAD.    Head:   Normocephalic, atraumatic.    Eyes:   PERRLA, EOM's full, sclera white, conjunctiva not erythematous, and medial canthus without exudate bilaterally.    Ears:  Auricle and tragus non-tender to manipulation.  No pre-auricular lymphadenopathy or mastoid ttp.  EACs with mild cerumen bilaterally, not erythematous.  TM’s pearly gray with cone of light present and umbo and malleolus visible bilaterally.  No bulging or fluid bubbles present in middle ear.  Hearing grossly intact to voice.    Nose:  Nares not patent bilaterally.  Nasal mucosa edematous with yellow-white rhinorrhea bilaterally. Left Maxillary sinuses exquisitely tender to percussion bilaterally.    Throat:  Dentition wnl, mucosa moist without lesions.  Oropharynx erythematous, with no enlargement of the palatine tonsils bilaterally with no exudates.    Post nasal drainage present.  Soft palate rises symmetrically bilaterally and uvula midline.      Neck: Neck supple, with mild anterior lymphadenopathy that is soft and mobile to palpation. Thyroid non-palpable without tenderness or nodules. No supraclavicular lymphadenopathy.    Lungs:  Respiratory effort not labored without accessory muscle use.  Lungs with slight respiratory wheeze to auscultation bilaterally without rales. Rhonchi clear to cough.    Heart:  RRR, without murmurs rubs or " gallops.  Radial and dorsalis pedis pulse 2+ bilaterally.  No LE edema.    Musculoskeletal:  Gait non-antalgic with a narrow base.    Derm:  Skin without rashes or lesions with good turgor pressure.      Psychiatric:  Mood, affect, and judgement appropriate.         Assessment/Plan:     1. Acute non-recurrent maxillary sinusitis  amoxicillin-clavulanate (AUGMENTIN) 875-125 MG Tab    albuterol 108 (90 Base) MCG/ACT Aero Soln inhalation aerosol    promethazine-codeine (PHENERGAN-CODEINE) 6.25-10 MG/5ML Syrup   2. Cough  albuterol 108 (90 Base) MCG/ACT Aero Soln inhalation aerosol    promethazine-codeine (PHENERGAN-CODEINE) 6.25-10 MG/5ML Syrup      Patient presents with worsening sinus congestion and pressure thicker mucus drainage. On exam patient has sinus tenderness to percussion purulent postnasal drip. Prescribe Augmentin and albuterol inhaler given slight inspiratory wheeze. Cough medicine for bedtime use only.    Narcotic use report obtained with no indication of narcotic overuse or misuse and deemed necessary for treatment. Sedation, dependence, and constipation precautions given. Avoid use while driving or operating heavy machinery.     Patient was in agreement with this treatment plan and seemed to understand without barriers. All questions were encouraged and answered.  Reviewed signs and symptoms of when to seek emergency medical care.     Please note that this dictation was created using voice recognition software.  I have made every reasonable attempt to correct obvious errors, but I expect there are errors of mik and possibly content that I did not discover before finalizing the note.

## 2018-02-26 ENCOUNTER — OFFICE VISIT (OUTPATIENT)
Dept: NEUROLOGY | Facility: MEDICAL CENTER | Age: 48
End: 2018-02-26
Payer: COMMERCIAL

## 2018-02-26 VITALS
TEMPERATURE: 97.9 F | BODY MASS INDEX: 25.01 KG/M2 | OXYGEN SATURATION: 95 % | DIASTOLIC BLOOD PRESSURE: 76 MMHG | HEIGHT: 69 IN | WEIGHT: 168.87 LBS | HEART RATE: 71 BPM | SYSTOLIC BLOOD PRESSURE: 124 MMHG

## 2018-02-26 DIAGNOSIS — R53.82 CHRONIC FATIGUE: ICD-10-CM

## 2018-02-26 DIAGNOSIS — R53.83 FATIGUE, UNSPECIFIED TYPE: ICD-10-CM

## 2018-02-26 DIAGNOSIS — R42 VERTIGO: ICD-10-CM

## 2018-02-26 PROCEDURE — 99205 OFFICE O/P NEW HI 60 MIN: CPT | Performed by: PSYCHIATRY & NEUROLOGY

## 2018-02-26 RX ORDER — TESTOSTERONE CYPIONATE 200 MG/ML
50 INJECTION, SOLUTION INTRAMUSCULAR ONCE
COMMUNITY

## 2018-02-26 NOTE — PROGRESS NOTES
"CC: Vertigo and fatigue      HPI:    Darrell Latham is a 47 y.o. HIV + male who has never been treated with antiretroviral therapy who presents today in initial neurologic consultation for vertigo and fatigue. He is referred by his primary care provider, Geeta Fernandez.     Mr. Latham tells me that he has been suffering from a variety of symptoms and is concerned he may have Multiple Sclerosis. His episodic vertigo began a year and a half ago while he was rock climbing in [place]. He was at the top of a climb when the vertigo occurred. At the time, he could not tell if the episode was triggered by moving very quickly. It has recurred since then and at times seems triggered by head movement and other times not. He describes it as a feeling of going from 0 to 60 on a roller coaster, and it has even woken him out of sleep at times. He sometimes experiences bursts of these episodes which usually last for a few seconds at a a time. sually the episodes last seconds. Sometimes he experiences \"bursts\" of these episodes. He was evaluated by an ENT specialist who ordered a CT of the head, which did not reveal a clear cause of his symptoms.     He also reports extreme fatigue for several years. His PCP checked his testosterone levels and found them to be very low. He was started on testosterone replacement therapy.      Has also been having pain in his neck and back and had been going to [facility] who thought his vertigo was related to his nasal issues. He received an epidural injection into his upper back. An MRI was down that found a partially protruding disc at C7-C6. He also reports ongoing digestive issues, was evaluated by a GI specialist, had endoscopy and colonoscopy. He has a gallbladder removal surgery scheduled for this Wednesday.     He has had chronic insomnia for about two years as well, sleeping 4-5 hours per night at most. He was prescribed Temazepam by his PCP as needed as he was " traveling a lot for work. A 3 month supply would last him 3-4 months at the time. He started to notice he was being awoken at night because of stomach cramping. He requested a stronger prescription of sleeping aid and was given lorazepam, but this wasn't really helping him stay asleep. He subsequently requested Xanax, but his primary care provider refused.     He reports muscle spasms in his arms. Despite always having been an athletic person, he finds physical activity very difficult.       ROS:   Constitutional: No fevers or chills. +ongoing fatigue.  Eyes: + occsaional blurry vision, no diplopia or eye pain.  ENT: No dysphagia or hearing loss.  Respiratory: No cough or shortness of breath.  Cardiovascular: No chest pain or palpitations.  GI: + nausea, but no vomiting or diarrhea.  : No urinary incontinence or dysuria.  Musculoskeletal: No joint swelling or arthralgias.  Skin: No skin rashes.  Neuro: No headaches. + vertigo as described, + fine tremors attributed to caffeine.  Endocrine: No heat or cold intolerance. + episode of polydipsia in the past - found to have hyponatremia.  Psych: No depression or anxiety.  Heme/Lymph: No easy bruising or swollen lymph nodes.      Past Medical History:   Past Medical History:   Diagnosis Date   • HIV (human immunodeficiency virus infection) (CMS-HCC)     Diagnosed in [place], has never been on ART or had AIDS defining illness.   • Low testosterone in male        Past Surgical History: History reviewed. No pertinent surgical history. Will be having cholecystectomy in two days.     Social History:   Social History     Social History   • Marital status:      Spouse name: N/A   • Number of children: N/A   • Years of education: N/A     Occupational History   • Not on file.     Social History Main Topics   • Smoking status: Never Smoker   • Smokeless tobacco: Never Used   • Alcohol use No      Comment: Socially - rarely   • Drug use: No   • Sexual activity: Not  Currently     Other Topics Concern   • Not on file     Social History Narrative   • No narrative on file       Allergies:   Allergies   Allergen Reactions   • Latex      rash   • Seasonal Itching     Sinus pressure, itchy eyes, itchy throat         Physical Exam:   Constitutional: Well-developed, well-nourished, good hygiene. Appears stated age.  Cardiovascular: RRR, with no murmurs, rubs or gallops. No carotid bruits. No peripheral edema, pedal pulses intact.   Respiratory: Lungs CTA B/L, no W/R/R.   Skin: Warm, dry, intact. No rashes observed.  Eyes:    Funduscopic: Optic discs flat with no evidence of papilledema or pallor. Normal posterior segments.  Neurologic:   Mental Status: Awake, alert, oriented x 3.   Speech: Fluent with normal prosody.   Memory: Able to recall 3 words at 1 minute and 5 minutes. Able to recall recent and remote events accurately.    Concentration: Attentive. Able to focus on history and follow multi-step commands.   Fund of Knowledge:    Cranial Nerves:   Sensory: Intact light touch, vibration and temperature.    Coordination/movements: Fine tremor in bilateral hands with extension. Left hand with mild ataxic tremor.   DTR's: 1+ throughout without clonus.    Babinski: Toes downgoing bilaterally.   Romberg: Negative.  Musculoskeletal:    Strength: 5/5 in upper and lower extremities bilaterally.   Gait: Steady, narrow based. Able to perform tandem walking.   Tone: Normal bulk and tone.   Joints: No swelling.     Labs:  Most recent lab studies from 8/22/17. WBC 6.1, Platelets 219, Absolute lymphocytes 1.35, hgb 16.2, hct 44.8, Sodium 132, potassium 3.6, Glucose 112, Cl 99, Co2 26, Ca 9.4, Troponin <0.02.     Imaging:   Today I reviewed the patient's most recent CT head and cervical spine MRI images with him in the examination room. I explained basic terminology of each imaging modality, verbalized my assessment, and answered his questions.     MRI of the cervical spine w/o contrast from  7/7/2017 done at [facility]  Clinical Indication:  Mid neck pain increasing in severity. Bilateral arm numbness. Blurred vision with ataxia.   Technique:  Multiple acquisition parameters were performed to evaluate the cervical spine utilizing the Siemens Aera 1.5T MRI. No IV contrast was administered.   Comparison:  None.     Findings:  The cervical spine demonstrates no evidence of malalignment. The vertebral bodies are normal in appearance without evidence of compression fractures or metastatic disease.   The cervical cord and craniocervical junction are normal in appearance. There is no spinal stenosis.   The discs are normal through C2-3.   There is straightening of normal lordotic curvature with slight kyphotic angulation at C3-4.   C3-4 level demonstrates mild right-sided uncinate process arthropathy with no appreciable nerve root impingement.   C4-5: Mild right-sided uncinate process arthropathy and disc osteophyte changes (axial 14 series 10).   C5-6: Disc osteophyte changes seen centrally with slight impingement, ventral portion of the cord. Minimal uncinate process arthropathy present.   C6-7: Mild right-sided uncinate process arthropathy is seen with mild right-sided lateral recess narrowing (axial 24 series 10).   C7-T1, T1-2: Normal.     Impression:  Mild degenerative disc disease and disc osteophyte changes C3-4 through C6-7 as described above with minimal lateral recess and foraminal encroachment demonstrated.     Electronically Signed By: José Miguel Rock MD       Noncontrast Head CT of the IAC's from 7/24/2017  Clinical Indication:  Chronic loss of balance with vertigo and pain and pressure with fatigue and lack of concentration.   Technique:  High resolution 0.625 mm thin section helical CT of the temporal bones was performed using the [employer] 64 Slice VCT with ASIR dose reduction. Coronal and sagittal reconstructions were generated. No IV contrast was  administered.   Comparison:  None.     Findings:  Right Temporal Bone:   There is a small amount of nonobstructing cerumen in the right external auditory canal. The external auditory canal is otherwise patent. The tympanic membrane and ossicles are normal. The middle ear cavity is clear. The scutum is sharp. Prussak's space is clear. The inner ear structures (cochlea, vestibule, and semicircular canals) are unremarkable. The vestibular aqueduct is normal caliber. The internal auditory and facial nerve canals are normal. The mastoid air cells are clear.   Left Temporal Bone:   The external auditory canal is patent. The tympanic membrane and ossicles are normal. The middle ear cavity is clear. The scutum is sharp. Prussak's space is clear. The inner ear structures (cochlea, vestibule, and semicircular canals) are unremarkable. The vestibular aqueduct is normal caliber. The internal auditory and facial nerve canals are normal. The mastoid air cells are clear.   Surrounding Structures: The imaged paranasal sinuses are clear. The visualized portion of the brain is grossly unremarkable.     Impression:  Normal CT of the temporal bones.     Electronically Signed  By: Abraham Kulkarni MD       Assessment/Plan:  Chronic fatigue  Mr. Latham is a 48 yo HIV positive M never on ART who presents with 2 years of chronic fatigue that he is concerned may be related to Multiple Sclerosis. I discussed with him that it is very unlikely that he has both multiple sclerosis and HIV given they affect the immune system in the exact opposite way, I.e. MS is due to immune over-activity, and HIV causes immunodeficiency. While HIV can cause fatigue in and of itself, I suggested we check some lab studies to rule out other causes of fatigue such as hypothyroidism and vitamin B12 or folate deficiency. He has had hyponatremia in the past, and I would like to recheck this. If he has chronic hyponatremia, this may require workup through a  nephrologist. His interrupted sleep may also be contributing to this, and I am curious to see if his energy is improved after his cholecystectomy in the event it resolves his ongoing stomach cramping which has been waking hi up at night. While there are other rare causes of chronic fatigue such as mitochondrial disorders, this is quite rare and may require invasive testing such as a muscle biopsy. I advised him to check these lab studies first and we will proceed from there.     Plan:  1. Check labs including CBC with differential, CMP, B12, Folate, TSH, Lactate and Pyruvate.  2. He will have his PCP fax me over his most recent lab studies. Fax number written down and provided to patient.    Vertigo  48 yo HIV positive male with episodic vertigo lasting several seconds at a time, sometimes provoked by head position who has had a negative ENT workup including head CT of the IAC's. This sounds most like benign positional vertigo, however, given his HIV positive status and constellation of other complaints including fatigue, occasional blurry vision, I recommend a brain MRI with and without contrast to rule out any intraparenchymal cause of his vertigo.     Plan:  1. MRI brain w/wo contrast.     Greater than 50% of this 60 minute face to face encounter was devoted to disease state counseling and coordination of care. Please see above assessment and plan for discussion.

## 2018-02-26 NOTE — ASSESSMENT & PLAN NOTE
Mr. Latham is a 48 yo HIV positive M never on ART who presents with 2 years of chronic fatigue that he is concerned may be related to Multiple Sclerosis. I discussed with him that it is very unlikely that he has both multiple sclerosis and HIV given they affect the immune system in the exact opposite way, I.e. MS is due to immune over-activity, and HIV causes immunodeficiency. While HIV can cause fatigue in and of itself, I suggested we check some lab studies to rule out other causes of fatigue such as hypothyroidism and vitamin B12 or folate deficiency. He has had hyponatremia in the past, and I would like to recheck this. If he has chronic hyponatremia, this may require workup through a nephrologist. His interrupted sleep may also be contributing to this, and I am curious to see if his energy is improved after his cholecystectomy in the event it resolves his ongoing stomach cramping which has been waking hi up at night. While there are other rare causes of chronic fatigue such as mitochondrial disorders, this is quite rare and may require invasive testing such as a muscle biopsy. I advised him to check these lab studies first and we will proceed from there.     Plan:  1. Check labs including CBC with differential, CMP, B12, Folate, TSH, Lactate and Pyruvate.  2. He will have his PCP fax me over his most recent lab studies. Fax number written down and provided to patient.

## 2018-02-26 NOTE — ASSESSMENT & PLAN NOTE
48 yo HIV positive male with episodic vertigo lasting several seconds at a time, sometimes provoked by head position who has had a negative ENT workup including head CT of the IAC's. This sounds most like benign positional vertigo, however, given his HIV positive status and constellation of other complaints including fatigue, occasional blurry vision, I recommend a brain MRI with and without contrast to rule out any intraparenchymal cause of his vertigo.     Plan:  1. MRI brain w/wo contrast.

## 2018-02-27 DIAGNOSIS — Z01.812 PRE-OPERATIVE LABORATORY EXAMINATION: ICD-10-CM

## 2018-02-27 LAB
ANION GAP SERPL CALC-SCNC: 11 MMOL/L (ref 0–11.9)
BUN SERPL-MCNC: 17 MG/DL (ref 8–22)
CALCIUM SERPL-MCNC: 9.6 MG/DL (ref 8.5–10.5)
CHLORIDE SERPL-SCNC: 100 MMOL/L (ref 96–112)
CO2 SERPL-SCNC: 25 MMOL/L (ref 20–33)
CREAT SERPL-MCNC: 1.22 MG/DL (ref 0.5–1.4)
ERYTHROCYTE [DISTWIDTH] IN BLOOD BY AUTOMATED COUNT: 41.2 FL (ref 35.9–50)
GLUCOSE SERPL-MCNC: 138 MG/DL (ref 65–99)
HCT VFR BLD AUTO: 48.9 % (ref 42–52)
HGB BLD-MCNC: 17 G/DL (ref 14–18)
MCH RBC QN AUTO: 30.9 PG (ref 27–33)
MCHC RBC AUTO-ENTMCNC: 34.8 G/DL (ref 33.7–35.3)
MCV RBC AUTO: 88.7 FL (ref 81.4–97.8)
PLATELET # BLD AUTO: 254 K/UL (ref 164–446)
PMV BLD AUTO: 9.6 FL (ref 9–12.9)
POTASSIUM SERPL-SCNC: 4.3 MMOL/L (ref 3.6–5.5)
RBC # BLD AUTO: 5.51 M/UL (ref 4.7–6.1)
SODIUM SERPL-SCNC: 136 MMOL/L (ref 135–145)
WBC # BLD AUTO: 7.6 K/UL (ref 4.8–10.8)

## 2018-02-27 PROCEDURE — 80048 BASIC METABOLIC PNL TOTAL CA: CPT

## 2018-02-27 PROCEDURE — 36415 COLL VENOUS BLD VENIPUNCTURE: CPT

## 2018-02-27 PROCEDURE — 85027 COMPLETE CBC AUTOMATED: CPT

## 2018-02-27 RX ORDER — ONDANSETRON 4 MG/1
4 TABLET, ORALLY DISINTEGRATING ORAL EVERY 8 HOURS PRN
COMMUNITY
End: 2019-05-29

## 2018-02-27 RX ORDER — RANITIDINE 150 MG/1
150 TABLET ORAL PRN
COMMUNITY
End: 2018-05-30

## 2018-02-27 RX ORDER — HYOSCYAMINE SULFATE 0.125 MG
0.12 TABLET,DISINTEGRATING ORAL EVERY 4 HOURS
COMMUNITY
End: 2018-05-30

## 2018-02-27 NOTE — OR NURSING
Pre admit appt: Pt instructed to continue regularly prescribed medications through day before surgery.Per anesthesia protocol pt instructed to take these medications with a sip of water the day of surgery- zantac and hyosycamine.

## 2018-02-28 ENCOUNTER — HOSPITAL ENCOUNTER (OUTPATIENT)
Facility: MEDICAL CENTER | Age: 48
End: 2018-02-28
Attending: SURGERY | Admitting: SURGERY
Payer: COMMERCIAL

## 2018-02-28 VITALS
SYSTOLIC BLOOD PRESSURE: 137 MMHG | HEIGHT: 69 IN | HEART RATE: 71 BPM | DIASTOLIC BLOOD PRESSURE: 96 MMHG | BODY MASS INDEX: 25.08 KG/M2 | OXYGEN SATURATION: 96 % | RESPIRATION RATE: 14 BRPM | WEIGHT: 169.31 LBS | TEMPERATURE: 98.4 F

## 2018-02-28 DIAGNOSIS — G89.18 POST-OP PAIN: ICD-10-CM

## 2018-02-28 LAB — PATHOLOGY CONSULT NOTE: NORMAL

## 2018-02-28 PROCEDURE — 88304 TISSUE EXAM BY PATHOLOGIST: CPT

## 2018-02-28 PROCEDURE — 160048 HCHG OR STATISTICAL LEVEL 1-5: Performed by: SURGERY

## 2018-02-28 PROCEDURE — A9270 NON-COVERED ITEM OR SERVICE: HCPCS

## 2018-02-28 PROCEDURE — 700101 HCHG RX REV CODE 250

## 2018-02-28 PROCEDURE — 700105 HCHG RX REV CODE 258: Performed by: SURGERY

## 2018-02-28 PROCEDURE — 160028 HCHG SURGERY MINUTES - 1ST 30 MINS LEVEL 3: Performed by: SURGERY

## 2018-02-28 PROCEDURE — 500868 HCHG NEEDLE, SURGI(VARES): Performed by: SURGERY

## 2018-02-28 PROCEDURE — 160025 RECOVERY II MINUTES (STATS): Performed by: SURGERY

## 2018-02-28 PROCEDURE — 502571 HCHG PACK, LAP CHOLE: Performed by: SURGERY

## 2018-02-28 PROCEDURE — 500854 HCHG NEEDLE, INSUFFLATION FOR STEP: Performed by: SURGERY

## 2018-02-28 PROCEDURE — 501583 HCHG TROCAR, THRD CAN&SEAL 5X100: Performed by: SURGERY

## 2018-02-28 PROCEDURE — 501572 HCHG TROCAR, SHIELD OBTU 5X100: Performed by: SURGERY

## 2018-02-28 PROCEDURE — 160009 HCHG ANES TIME/MIN: Performed by: SURGERY

## 2018-02-28 PROCEDURE — 160002 HCHG RECOVERY MINUTES (STAT): Performed by: SURGERY

## 2018-02-28 PROCEDURE — 160039 HCHG SURGERY MINUTES - EA ADDL 1 MIN LEVEL 3: Performed by: SURGERY

## 2018-02-28 PROCEDURE — 700111 HCHG RX REV CODE 636 W/ 250 OVERRIDE (IP)

## 2018-02-28 PROCEDURE — A6402 STERILE GAUZE <= 16 SQ IN: HCPCS | Performed by: SURGERY

## 2018-02-28 PROCEDURE — 501399 HCHG SPECIMAN BAG, ENDO CATC: Performed by: SURGERY

## 2018-02-28 PROCEDURE — 501582 HCHG TROCAR, THRD BLADED: Performed by: SURGERY

## 2018-02-28 PROCEDURE — 500697 HCHG HEMOCLIP, LARGE (ORANGE): Performed by: SURGERY

## 2018-02-28 PROCEDURE — 160036 HCHG PACU - EA ADDL 30 MINS PHASE I: Performed by: SURGERY

## 2018-02-28 PROCEDURE — 160046 HCHG PACU - 1ST 60 MINS PHASE II: Performed by: SURGERY

## 2018-02-28 PROCEDURE — 500800 HCHG LAPAROSCOPIC J/L HOOK: Performed by: SURGERY

## 2018-02-28 PROCEDURE — 501838 HCHG SUTURE GENERAL: Performed by: SURGERY

## 2018-02-28 PROCEDURE — 160035 HCHG PACU - 1ST 60 MINS PHASE I: Performed by: SURGERY

## 2018-02-28 PROCEDURE — 700102 HCHG RX REV CODE 250 W/ 637 OVERRIDE(OP)

## 2018-02-28 RX ORDER — BUPIVACAINE HYDROCHLORIDE AND EPINEPHRINE 5; 5 MG/ML; UG/ML
INJECTION, SOLUTION EPIDURAL; INTRACAUDAL; PERINEURAL
Status: DISCONTINUED | OUTPATIENT
Start: 2018-02-28 | End: 2018-02-28 | Stop reason: HOSPADM

## 2018-02-28 RX ORDER — OXYCODONE HCL 5 MG/5 ML
SOLUTION, ORAL ORAL
Status: COMPLETED
Start: 2018-02-28 | End: 2018-02-28

## 2018-02-28 RX ORDER — SODIUM CHLORIDE, SODIUM LACTATE, POTASSIUM CHLORIDE, CALCIUM CHLORIDE 600; 310; 30; 20 MG/100ML; MG/100ML; MG/100ML; MG/100ML
1000 INJECTION, SOLUTION INTRAVENOUS
Status: DISCONTINUED | OUTPATIENT
Start: 2018-02-28 | End: 2018-02-28 | Stop reason: HOSPADM

## 2018-02-28 RX ORDER — LIDOCAINE HYDROCHLORIDE 10 MG/ML
INJECTION, SOLUTION INFILTRATION; PERINEURAL
Status: COMPLETED
Start: 2018-02-28 | End: 2018-02-28

## 2018-02-28 RX ORDER — HYDROCODONE BITARTRATE AND ACETAMINOPHEN 5; 325 MG/1; MG/1
1-2 TABLET ORAL EVERY 4 HOURS PRN
Qty: 30 TAB | Refills: 0 | Status: SHIPPED | OUTPATIENT
Start: 2018-02-28 | End: 2018-03-07

## 2018-02-28 RX ADMIN — OXYCODONE HYDROCHLORIDE 5 MG: 5 SOLUTION ORAL at 08:53

## 2018-02-28 RX ADMIN — FENTANYL CITRATE 50 MCG: 50 INJECTION, SOLUTION INTRAMUSCULAR; INTRAVENOUS at 09:10

## 2018-02-28 RX ADMIN — LIDOCAINE HYDROCHLORIDE 0.2 ML: 10 INJECTION, SOLUTION INFILTRATION; PERINEURAL at 06:45

## 2018-02-28 RX ADMIN — FENTANYL CITRATE 50 MCG: 50 INJECTION, SOLUTION INTRAMUSCULAR; INTRAVENOUS at 08:46

## 2018-02-28 RX ADMIN — FENTANYL CITRATE 50 MCG: 50 INJECTION, SOLUTION INTRAMUSCULAR; INTRAVENOUS at 08:40

## 2018-02-28 ASSESSMENT — PAIN SCALES - GENERAL
PAINLEVEL_OUTOF10: 2
PAINLEVEL_OUTOF10: 8
PAINLEVEL_OUTOF10: 7
PAINLEVEL_OUTOF10: 5
PAINLEVEL_OUTOF10: 9
PAINLEVEL_OUTOF10: 8
PAINLEVEL_OUTOF10: 4
PAINLEVEL_OUTOF10: 7
PAINLEVEL_OUTOF10: ASSUMED PAIN PRESENT

## 2018-02-28 NOTE — PROGRESS NOTES
Laparoscopic Cholecystectomy D/C instructions:    1. DIET: Upon discharge from the hospital you may resume your normal preoperative diet. Depending on how you are feeling and whether you have nausea or not, you may wish to stay with a bland diet for the first few days. However, you can advance this as quickly as you feel ready.    2. ACTIVITIES: After discharge from the hospital, you may resume full routine activities. However, there should be no heavy lifting (greater than 15 pounds) and no strenuous activities until after your follow-up visit. Otherwise, routine activities of daily living are acceptable.    3. DRIVING: You may drive whenever you are off pain medications and are able to perform the activities needed to drive, i.e. turning, bending, twisting, etc.    4. BATHING: You may get the wound wet at any time after leaving the hospital. You may shower, but do not submerge in a bath for at least a week. Dressings may come off after 48 hours.    5. BOWEL FUNCTION: A few patients, after this operation, will develop either frequent or loose stools after meals. This usually corrects itself after a few days, to a few weeks. If this occurs, do not worry; it is not unusual and will resolve. Much more common than loose stools, is constipation. The combination of pain medication and decreased activity level can cause constipation in otherwise normal patients. If you feel this is occurring, take a laxative (Milk of Magnesia, Ex-Lax, Senokot, etc.) until the problem has resolved.    6. PAIN MEDICATION: You will be given a prescription for pain medication at discharge. Please take these as directed. It is important to remember not to take medications on an empty stomach as this may cause nausea.     It is also helpful to take other non-narcotic over the counter medications to help with pain control and to decrease the need for narcotic medications. I recommend ibuprofen, taken every 8 hours, dosing as directed on the  medication bottle.    It is useful to slowly decrease the number of narcotic pain medications you take starting the first day after surgery, as you are able. If you need a refill, Dr. James's office will provide you with one refill at your post-operative visit. After this, no more narcotic pain medications will be given.     7.CALL IF YOU HAVE: (1) Fevers to more than 1010 F, (2) Unusual chest or leg pain, (3) Drainage or fluid from incision that may be foul smelling, increased tenderness or soreness at the wound or the wound edges are no longer together, redness or swelling at the incision site. Please do not hesitate to call with any other questions.     8. APPOINTMENT: Contact our office at [phone] for a follow-up appointment in 1 to 2 weeks following your procedure.    If you have any additional questions, please do not hesitate to call the office and speak to either myself or the physician on call.    Office address:  [facility] [address] [place] [address]    Bernadine James M.D.  [facility]  [phone]

## 2018-02-28 NOTE — OP REPORT
Operative Report    Date: 2/28/2018    Surgeon: Bernadine James M.D.    Assistant: MELISSA Whelan    Anesthesiologist: Anahi HARDING    Preoperative Diagnosis: K80.10 Calculus of gallbladder with chronic cholecystitis without obstruction    Postoperative Diagnosis: same    Procedure Performed: 75646 Laparoscopy, surgical; cholecystectomy    Indications: This is a 47 y.o. male who presented with abdominal pain, bloating. History, physical and diagnostic studies are consistent with biliary dyskinesia.    An extensive procedures, alternative, risks and questions conference was held with the patient in regard to the surgical treatment of cholecystitis. The patient was counseled regarding the benefits of the operation, namely, removal of gallbladder and alleviation of his symptoms. The patient was made aware of the alternatives, including operative and non-operative management. The risks of bleeding, infection, damage to surrounding structures, need for reoperation, need for open operation, bile duct injury, stroke, MI, and death were discussed with the patient. The patient was given a chance to ask questions, and all his questions were answered. Discussion was undertaken with Layman's terms. The patient demonstrated adequate understanding, seemed pleased with the plan, and wish to proceed. Consent was given in clear state of mind.  Consent was signed.     Findings: normal appearing gallbladder.    Procedure in detail: The patient was brought to the operating room and was placed in the supine position where general endotracheal anesthesia was induced. SCDs were in place and functioning. Preoperative antibiotics of ancef were given before incision time. The patient's abdomen was prepped and draped in the usual sterile fashion.    After infiltration of local anesthetic, a skin incision was made to accommodate a 5 mm port infra-umbilically. We then used the Veress needle to access the peritoneum, and after saline drop  test, we insufflated to 15 mmHg. We then placed the 5mm 30 degree camera and inspected the abdomen for evidence of trauma secondary to Veress needle or port placement, and found none.    Utilizing the 30-degree laparoscope with the patient in the reverse Trendelenburg position, and after infiltration of local anesthetic, an additional 11-mm port was inserted into the epigastrium just to the right of the midline. Then two 5-mm ports were inserted in the midclavicular and anterior axillary lines, in the right upper quadrant, all under direct visualization.    The gallbladder was identified, it appeared normal in appearance. The gallbladder was retracted cephalad and caudally using gallbladder graspers. Using the [place], we were able to peel the overlying peritoneum off the cystic duct, and circumferentially dissect it. We used electrocautery to futher remove the peritoneum off the gallbladder. We then were able to further dissect Calot's triangle until we identified the cystic artery, which was circumferrentially dissected.     We then doubly clipped the cystic duct distally and divided it. We then doubly clipped the cystic artery  and divided it.Then, using electrocautery, we removed the gallbladder from the liver bed. After ensuring gallbladder bed hemostasis with cautery, we removed the gallbladder and placed it in an endocatch bag, and removed it from the epigastric port site.    The right upper quadrant was irrigated copiously with normal saline solution. We inspected the cystic duct and artery stumps, and found them to be intact. The liver bed was hemostatic.    The trocars were removed under direct visualization.    The fascia on the all port sites >10 mm were closed with Vicryl sutures. The skin of all incisions was closed with running subcuticular suture.    All sponge, needle, and instrument counts were reported as correct at the end of the procedure. The patient tolerated the procedure well and left the  operating room for the recovery room in stable and satisfactory condition.    Estimated Blood Loss: minimal     Specimens: gallbladder for permanent pathology    Complications: none apparent     Drains: none     Disposition: stable, extubated, to PACU    Bernadine James M.D.  [facility]  [phone]

## 2018-02-28 NOTE — PROGRESS NOTES
Narcotic  check, risk stratification, and patient informed consent undertaken in the office, does not need to be repeated in hospital setting.    Bernadine James M.D.  [facility]  [phone]

## 2018-02-28 NOTE — DISCHARGE INSTRUCTIONS
ACTIVITY: Rest and take it easy for the first 24 hours.  A responsible adult is recommended to remain with you during that time.  It is normal to feel sleepy.  We encourage you to not do anything that requires balance, judgment or coordination.    MILD FLU-LIKE SYMPTOMS ARE NORMAL. YOU MAY EXPERIENCE GENERALIZED MUSCLE ACHES, THROAT IRRITATION, HEADACHE AND/OR SOME NAUSEA.    FOR 24 HOURS DO NOT:  Drive, operate machinery or run household appliances.  Drink beer or alcoholic beverages.   Make important decisions or sign legal documents.    SPECIAL INSTRUCTIONS: See below for surgeon's specific orders    DIET: To avoid nausea, slowly advance diet as tolerated, avoiding spicy or greasy foods for the first day.  Add more substantial food to your diet according to your physician's instructions.    INCREASE FLUIDS AND FIBER TO AVOID CONSTIPATION.    SURGICAL DRESSING/BATHING: See below    FOLLOW-UP APPOINTMENT:  A follow-up appointment should be arranged with your doctor  Call to schedule. [phone]    You should CALL YOUR PHYSICIAN if you develop:  Fever greater than 101 degrees F.  Pain not relieved by medication, or persistent nausea or vomiting.  Excessive bleeding (blood soaking through dressing) or unexpected drainage from the wound.  Extreme redness or swelling around the incision site, drainage of pus or foul smelling drainage.  Inability to urinate or empty your bladder within 8 hours.  Problems with breathing or chest pain.    You should call 911 if you develop problems with breathing or chest pain.  If you are unable to contact your doctor or surgical center, you should go to the nearest emergency room or urgent care center.  Physician's telephone #: [phone]    If any questions arise, call your doctor.  If your doctor is not available, please feel free to call the Surgical Center at [phone].  The Center is open Monday through Friday from 7AM to 7PM.  You can also call the HEALTH HOTLINE open 55  hours/day, 7 days/week and speak to a nurse at [phone], or toll free at [phone].    A registered nurse may call you a few days after your surgery to see how you are doing after your procedure.    MEDICATIONS: Resume taking daily medication.  Take prescribed pain medication with food.  If no medication is prescribed, you may take non-aspirin pain medication if needed.  PAIN MEDICATION CAN BE VERY CONSTIPATING.  Take a stool softener or laxative such as senokot, pericolace, or milk of magnesia if needed.    Prescription given for hydrocodone.  Last pain medication given at 0853 10 mg oxycodone oral  If your physician has prescribed pain medication that includes Acetaminophen (Tylenol), do not take additional Acetaminophen (Tylenol) while taking the prescribed medication.    Laparoscopic Cholecystectomy D/C instructions:     1. DIET: Upon discharge from the hospital you may resume your normal preoperative diet. Depending on how you are feeling and whether you have nausea or not, you may wish to stay with a bland diet for the first few days. However, you can advance this as quickly as you feel ready.     2. ACTIVITIES: After discharge from the hospital, you may resume full routine activities. However, there should be no heavy lifting (greater than 15 pounds) and no strenuous activities until after your follow-up visit. Otherwise, routine activities of daily living are acceptable.     3. DRIVING: You may drive whenever you are off pain medications and are able to perform the activities needed to drive, i.e. turning, bending, twisting, etc.     4. BATHING: You may get the wound wet at any time after leaving the hospital. You may shower, but do not submerge in a bath for at least a week. Dressings may come off after 48 hours.     5. BOWEL FUNCTION: A few patients, after this operation, will develop either frequent or loose stools after meals. This usually corrects itself after a few days, to a few weeks. If  this occurs, do not worry; it is not unusual and will resolve. Much more common than loose stools, is constipation. The combination of pain medication and decreased activity level can cause constipation in otherwise normal patients. If you feel this is occurring, take a laxative (Milk of Magnesia, Ex-Lax, Senokot, etc.) until the problem has resolved.     6. PAIN MEDICATION: You will be given a prescription for pain medication at discharge. Please take these as directed. It is important to remember not to take medications on an empty stomach as this may cause nausea.      It is also helpful to take other non-narcotic over the counter medications to help with pain control and to decrease the need for narcotic medications. I recommend ibuprofen, taken every 8 hours, dosing as directed on the medication bottle.     It is useful to slowly decrease the number of narcotic pain medications you take starting the first day after surgery, as you are able. If you need a refill, Dr. James's office will provide you with one refill at your post-operative visit. After this, no more narcotic pain medications will be given.      7.CALL IF YOU HAVE: (1) Fevers to more than 1010 F, (2) Unusual chest or leg pain, (3) Drainage or fluid from incision that may be foul smelling, increased tenderness or soreness at the wound or the wound edges are no longer together, redness or swelling at the incision site. Please do not hesitate to call with any other questions.      8. APPOINTMENT: Contact our office at [phone] for a follow-up appointment in 1 to 2 weeks following your procedure.     If you have any additional questions, please do not hesitate to call the office and speak to either myself or the physician on call.     Office address:  [address] [place] [address]     Bernadine James M.D.  [facility]  [phone]    Depression / Suicide Risk    As you are discharged from this [facility], it is important  to learn how to keep safe from harming yourself.    Recognize the warning signs:  · Abrupt changes in personality, positive or negative- including increase in energy   · Giving away possessions  · Change in eating patterns- significant weight changes-  positive or negative  · Change in sleeping patterns- unable to sleep or sleeping all the time   · Unwillingness or inability to communicate  · Depression  · Unusual sadness, discouragement and loneliness  · Talk of wanting to die  · Neglect of personal appearance   · Rebelliousness- reckless behavior  · Withdrawal from people/activities they love  · Confusion- inability to concentrate     If you or a loved one observes any of these behaviors or has concerns about self-harm, here's what you can do:  · Talk about it- your feelings and reasons for harming yourself  · Remove any means that you might use to hurt yourself (examples: pills, rope, extension cords, firearm)  · Get professional help from the community (Mental Health, Substance Abuse, psychological counseling)  · Do not be alone:Call your Safe Contact- someone whom you trust who will be there for you.  · Call your local CRISIS HOTLINE 902-4146 or 196-307-9111  · Call your local Children's Mobile Crisis Response Team [place] (615) 773-6906 or www.8bit  · Call the toll free National Suicide Prevention Hotlines   · National Suicide Prevention Lifeline 367-967-KPIJ (4510)  · National Hope Line Network 800-SUICIDE (806-7235)

## 2018-03-01 LAB
ALBUMIN SERPL-MCNC: 5 G/DL (ref 3.5–5.5)
ALBUMIN/GLOB SERPL: 2.2 {RATIO} (ref 1.2–2.2)
ALP SERPL-CCNC: 90 IU/L (ref 39–117)
ALT SERPL-CCNC: 26 IU/L (ref 0–44)
AST SERPL-CCNC: 23 IU/L (ref 0–40)
BASOPHILS # BLD AUTO: 0 X10E3/UL (ref 0–0.2)
BASOPHILS NFR BLD AUTO: 1 %
BILIRUB SERPL-MCNC: 1.3 MG/DL (ref 0–1.2)
BUN SERPL-MCNC: 16 MG/DL (ref 6–24)
BUN/CREAT SERPL: 13 (ref 9–20)
CALCIUM SERPL-MCNC: 10.1 MG/DL (ref 8.7–10.2)
CHLORIDE SERPL-SCNC: 98 MMOL/L (ref 96–106)
CO2 SERPL-SCNC: 24 MMOL/L (ref 18–29)
CREAT SERPL-MCNC: 1.2 MG/DL (ref 0.76–1.27)
EOSINOPHIL # BLD AUTO: 0.1 X10E3/UL (ref 0–0.4)
EOSINOPHIL NFR BLD AUTO: 1 %
ERYTHROCYTE [DISTWIDTH] IN BLOOD BY AUTOMATED COUNT: 13.3 % (ref 12.3–15.4)
FOLATE SERPL-MCNC: >19.9 NG/ML
GFR SERPLBLD CREATININE-BSD FMLA CKD-EPI: 72 ML/MIN/1.73
GFR SERPLBLD CREATININE-BSD FMLA CKD-EPI: 83 ML/MIN/1.73
GLOBULIN SER CALC-MCNC: 2.3 G/DL (ref 1.5–4.5)
GLUCOSE SERPL-MCNC: 64 MG/DL (ref 65–99)
HCT VFR BLD AUTO: 49.5 % (ref 37.5–51)
HGB BLD-MCNC: 17.2 G/DL (ref 13–17.7)
IMM GRANULOCYTES # BLD: 0 X10E3/UL (ref 0–0.1)
IMM GRANULOCYTES NFR BLD: 0 %
IMMATURE CELLS  115398: NORMAL
LACTATE SERPL-MCNC: 10.5 MG/DL (ref 4.8–25.7)
LYMPHOCYTES # BLD AUTO: 2.3 X10E3/UL (ref 0.7–3.1)
LYMPHOCYTES NFR BLD AUTO: 31 %
MCH RBC QN AUTO: 31.4 PG (ref 26.6–33)
MCHC RBC AUTO-ENTMCNC: 34.7 G/DL (ref 31.5–35.7)
MCV RBC AUTO: 91 FL (ref 79–97)
MONOCYTES # BLD AUTO: 0.6 X10E3/UL (ref 0.1–0.9)
MONOCYTES NFR BLD AUTO: 8 %
MORPHOLOGY BLD-IMP: NORMAL
NEUTROPHILS # BLD AUTO: 4.3 X10E3/UL (ref 1.4–7)
NEUTROPHILS NFR BLD AUTO: 59 %
NRBC BLD AUTO-RTO: NORMAL %
PLATELET # BLD AUTO: 249 X10E3/UL (ref 150–379)
POTASSIUM SERPL-SCNC: 4.4 MMOL/L (ref 3.5–5.2)
PROT SERPL-MCNC: 7.3 G/DL (ref 6–8.5)
RBC # BLD AUTO: 5.47 X10E6/UL (ref 4.14–5.8)
SODIUM SERPL-SCNC: 138 MMOL/L (ref 134–144)
TSH SERPL DL<=0.005 MIU/L-ACNC: 1.06 UIU/ML (ref 0.45–4.5)
VIT B12 SERPL-MCNC: 1074 PG/ML (ref 232–1245)
WBC # BLD AUTO: 7.2 X10E3/UL (ref 3.4–10.8)

## 2018-03-15 ENCOUNTER — HOSPITAL ENCOUNTER (OUTPATIENT)
Dept: RADIOLOGY | Facility: MEDICAL CENTER | Age: 48
End: 2018-03-15
Attending: PSYCHIATRY & NEUROLOGY
Payer: COMMERCIAL

## 2018-03-15 DIAGNOSIS — R42 VERTIGO: ICD-10-CM

## 2018-03-15 PROCEDURE — A9579 GAD-BASE MR CONTRAST NOS,1ML: HCPCS | Performed by: PSYCHIATRY & NEUROLOGY

## 2018-03-15 PROCEDURE — 700117 HCHG RX CONTRAST REV CODE 255: Performed by: PSYCHIATRY & NEUROLOGY

## 2018-03-15 PROCEDURE — 70553 MRI BRAIN STEM W/O & W/DYE: CPT

## 2018-03-15 RX ADMIN — GADODIAMIDE 17 ML: 287 INJECTION INTRAVENOUS at 13:30

## 2018-03-22 ENCOUNTER — TELEPHONE (OUTPATIENT)
Dept: NEUROLOGY | Facility: MEDICAL CENTER | Age: 48
End: 2018-03-22

## 2018-03-22 NOTE — TELEPHONE ENCOUNTER
I returned pt's call, he wanted to now his mri results I did tell him he has a f/v with Dr Camara next week on 03/29 that to make sure he comes to that appt to go over the trest results, he verbally agreed. Also pt stated he is experiencing dizziness and head rushes in which he says he spoke with Dr Camara about in his last office visit with her but is concerned about those symptoms.i stated to the pt that I would get back to him as soon as I hear from Dr Camara. Please advise, thanks.

## 2018-03-29 ENCOUNTER — OFFICE VISIT (OUTPATIENT)
Dept: URGENT CARE | Facility: CLINIC | Age: 48
End: 2018-03-29
Payer: COMMERCIAL

## 2018-03-29 ENCOUNTER — OFFICE VISIT (OUTPATIENT)
Dept: NEUROLOGY | Facility: MEDICAL CENTER | Age: 48
End: 2018-03-29
Payer: COMMERCIAL

## 2018-03-29 ENCOUNTER — APPOINTMENT (OUTPATIENT)
Dept: RADIOLOGY | Facility: IMAGING CENTER | Age: 48
End: 2018-03-29
Attending: NURSE PRACTITIONER
Payer: COMMERCIAL

## 2018-03-29 VITALS
HEART RATE: 76 BPM | OXYGEN SATURATION: 98 % | HEIGHT: 69 IN | BODY MASS INDEX: 24.82 KG/M2 | DIASTOLIC BLOOD PRESSURE: 78 MMHG | WEIGHT: 167.55 LBS | SYSTOLIC BLOOD PRESSURE: 106 MMHG | TEMPERATURE: 97.7 F

## 2018-03-29 VITALS
DIASTOLIC BLOOD PRESSURE: 90 MMHG | HEIGHT: 69 IN | OXYGEN SATURATION: 97 % | TEMPERATURE: 98.5 F | HEART RATE: 74 BPM | BODY MASS INDEX: 25 KG/M2 | SYSTOLIC BLOOD PRESSURE: 128 MMHG | WEIGHT: 168.8 LBS | RESPIRATION RATE: 16 BRPM

## 2018-03-29 DIAGNOSIS — M54.2 NECK PAIN: ICD-10-CM

## 2018-03-29 DIAGNOSIS — R42 VERTIGO: ICD-10-CM

## 2018-03-29 DIAGNOSIS — J02.9 SORE THROAT: ICD-10-CM

## 2018-03-29 DIAGNOSIS — R42 DIZZINESS: ICD-10-CM

## 2018-03-29 LAB
INT CON NEG: NORMAL
INT CON POS: NORMAL
S PYO AG THROAT QL: NEGATIVE

## 2018-03-29 PROCEDURE — 99214 OFFICE O/P EST MOD 30 MIN: CPT | Performed by: PSYCHIATRY & NEUROLOGY

## 2018-03-29 PROCEDURE — 87880 STREP A ASSAY W/OPTIC: CPT | Performed by: NURSE PRACTITIONER

## 2018-03-29 PROCEDURE — 72040 X-RAY EXAM NECK SPINE 2-3 VW: CPT | Mod: 26 | Performed by: NURSE PRACTITIONER

## 2018-03-29 PROCEDURE — 99214 OFFICE O/P EST MOD 30 MIN: CPT | Performed by: NURSE PRACTITIONER

## 2018-03-29 RX ORDER — DOCUSATE SODIUM 100 MG/1
CAPSULE, LIQUID FILLED ORAL
Refills: 0 | COMMUNITY
Start: 2018-01-16 | End: 2018-05-30

## 2018-03-29 RX ORDER — PANTOPRAZOLE SODIUM 40 MG/1
TABLET, DELAYED RELEASE ORAL
COMMUNITY
Start: 2018-01-14 | End: 2018-05-30

## 2018-03-29 ASSESSMENT — ENCOUNTER SYMPTOMS
WEAKNESS: 1
NECK PAIN: 1
DIZZINESS: 1
HEADACHES: 1
EYE PAIN: 0
DOUBLE VISION: 0
BLURRED VISION: 0
TINGLING: 1
DIAPHORESIS: 1

## 2018-03-29 ASSESSMENT — PAIN SCALES - GENERAL: PAINLEVEL: 7=MODERATE-SEVERE PAIN

## 2018-03-29 NOTE — PROGRESS NOTES
Subjective:      Darrell Latham is a 47 y.o. male who presents with Neck Pain (several months but last week it became severe, night sweats, weird sensations in head, dizziness, lightheaded and tender in arms and elbows )    Past Medical History:   Diagnosis Date   • Bowel habit changes     constipation and diarrhea   • Heart burn    • HIV (human immunodeficiency virus infection) ([facility])     Diagnosed in [place], has never been on ART or had AIDS defining illness.   • Low testosterone in male      Social History     Social History   • Marital status: Single     Spouse name: N/A   • Number of children: N/A   • Years of education: N/A     Occupational History   • Not on file.     Social History Main Topics   • Smoking status: Never Smoker   • Smokeless tobacco: Never Used   • Alcohol use 2.4 oz/week     4 Standard drinks or equivalent per week   • Drug use: No   • Sexual activity: Not Currently     Other Topics Concern   • Not on file     Social History Narrative   • No narrative on file     Family History   Problem Relation Age of Onset   • Alzheimer's Disease Maternal Uncle    • Alzheimer's Disease Paternal Grandmother    • Cancer Maternal Aunt      Kidney       Allergies ;Latex and Seasonal    Patient presents with multiple complaints:    1. Neck stiffness  2. Dizziness  3. Bilateral arm pain  4. Sore throat  5. Night sweats and possible fever      Patient's 47-year-old male who presents today with the above complaints. States the neck stiffness and pain has been ongoing for months but worsening over the last 2 months. States he traveled with a coworker who was allegedly diagnosed with meningitis 2 months ago. States he is now concerned that he has meningitis. He reports a subjective tactile fever but this has not been measured. He had a cholecystectomy done one month ago. States his neck pain is better today than it was yesterday but occasionally he has pain radiates into both arms. No chest pain or upper  "back pain. No shortness of breath. Patient states he has sometimes paresthesia in his upper extremities as well. He states that his dizziness has been ongoing for the last 2 months as well. I do find that the patient had an MRI done about 10 days ago and this was within normal limits. Upon further interview with the patient, I do find that he is currently under treatment with his primary doctor for the vertigo and actually has a follow-up visit this afternoon. Patient states that he did mention his concern for nec k pain and meningitis just primary physician at the last visit. Patient states he did recently travel and came home recently with upper respiratory symptoms and a sore throat as well.          HPI    Review of Systems   Constitutional: Positive for diaphoresis.   HENT: Negative for hearing loss and tinnitus.    Eyes: Negative for blurred vision, double vision and pain.   Musculoskeletal: Positive for neck pain.        Arm pain   Neurological: Positive for dizziness, tingling, weakness and headaches.        Light headedness    All other systems reviewed and are negative.         Objective:     Ht 1.753 m (5' 9\")   Wt 76.6 kg (168 lb 12.8 oz)   BMI 24.93 kg/m²      Physical Exam   Constitutional: He appears well-developed and well-nourished.   HENT:   Head: Normocephalic and atraumatic.   Right Ear: External ear normal.   Left Ear: External ear normal.   Nose: Nose normal.   Mouth/Throat: Oropharynx is clear and moist.   Eyes: Conjunctivae and EOM are normal. Pupils are equal, round, and reactive to light.   Neck: Normal range of motion and full passive range of motion without pain. Muscular tenderness present. No spinous process tenderness present. No neck rigidity. No edema, no erythema and normal range of motion present. No Brudzinski's sign and no Kernig's sign noted. No thyroid mass present.       Cardiovascular: Normal rate, regular rhythm and normal heart sounds.    Pulmonary/Chest: Effort normal " and breath sounds normal.   Musculoskeletal: Normal range of motion.   Neurological: He is alert. He has normal strength. He is not disoriented. He displays no atrophy and no tremor. No cranial nerve deficit or sensory deficit. He exhibits normal muscle tone. He displays a negative Romberg sign. He displays no seizure activity. Coordination and gait normal. GCS eye subscore is 4. GCS verbal subscore is 5. GCS motor subscore is 6.   Cranial nerves II through XII intact. Cerebellar function intact. Motor coordination intact. Strength is 5/5 and equal in the upper extremities,  5/5 and equal in the upper extremities. Gait is even and steady. Facial features symmetric with equal movements. Speech is clear and logical.   Skin: Skin is warm and dry.   Psychiatric: He has a normal mood and affect. His behavior is normal. Judgment and thought content normal.   Vitals reviewed.    XR c-spine:     HISTORY/REASON FOR EXAM:  Atraumatic Pain.  Neck pain and stiffness.    TECHNIQUE/EXAM DESCRIPTION AND NUMBER OF VIEWS:  Cervical spine series, 3 views.    COMPARISON:  None.    FINDINGS:    No acute fracture, malalignment, or prevertebral soft tissue swelling. Vertebral body height is maintained.  There is mild osteophytic spurring from the endplates of the C4 and C5 vertebral bodies. There is mild disc space narrowing. No soft tissue   abnormality is identified.     Impression       1.  No acute findings identified.    2.  Mild degenerative disc disease at C4-5.         poct strep:           Assessment/Plan:   Neck pain  Vertigo  Radicular pain to arms  URI   -MRI ordered   -Flonase OTC   -OTC decongestant as needed   -Keep follow up with PCP today for vertigo   -ER precautions for wor sening of sypmtoms   There are no diagnoses linked to this encounter.

## 2018-03-29 NOTE — PROGRESS NOTES
"CC: Vertigo and fatigue.       HPI:    Darrell Latham is a 47 y.o. male who presents today in neurological follow up for ongoing symptoms of a vertigo-like sensation that he describes as a \"head rush.\" He was seen in initial consultation on 2/26/18 at which time we had ordered brain MRI imaging to investigate his complaint. The brain MRI was within normal limits, but the symptoms have continued. He describes it as a sensation as if there were a \"surge of fluid coming into his head.\" It is located mostly in the back of his head and feels as if he was suddenly going from zero to 60 in a matter of seconds. There is no position that triggers the sensation. It has woken him up out of sleep before. His fatigue also persists and whereas he used to have considerable stamina and would compete in endurance competitions, he can no longer do so.     Since his last visit, he has also had his gallbladder removed.    ROS:   Constitutional: No fevers or chills. +fatigue.   Eyes: No blurry vision or eye pain.  ENT: No dysphagia or hearing loss.  Respiratory: No cough or shortness of breath.  Cardiovascular: No chest pain or palpitations.  GI: No nausea, vomiting, or diarrhea.  : No urinary incontinence or dysuria.  Musculoskeletal: No joint swelling or arthralgias.  Skin: No skin rashes.  Neuro: No headaches, dizziness, or tremors.  Endocrine: No heat or cold intolerance. No polydipsia or polyuria.  Psych: No depression or anxiety.  Heme/Lymph: No easy bruising or swollen lymph nodes.      Past Medical History:   Past Medical History:   Diagnosis Date   • Bowel habit changes     constipation and diarrhea   • Heart burn    • HIV (human immunodeficiency virus infection) (CMS-HCC)     Diagnosed in [place], has never been on ART or had AIDS defining illness.   • Low testosterone in male        Past Surgical History:   Past Surgical History:   Procedure Laterality Date   • FERNANDO BY LAPAROSCOPY  2/28/2018    Procedure: FERNANDO BY " LAPAROSCOPY;  Surgeon: Bernadine James M.D.;  Location: SURGERY [facility];  Service: General       Social History:   Social History     Social History   • Marital status: Single     Spouse name: N/A   • Number of children: N/A   • Years of education: N/A     Occupational History   • Not on file.     Social History Main Topics   • Smoking status: Never Smoker   • Smokeless tobacco: Never Used   • Alcohol use 2.4 oz/week     4 Standard drinks or equivalent per week   • Drug use: No   • Sexual activity: Not Currently     Other Topics Concern   • Not on file     Social History Narrative   • No narrative on file       Allergies:   Allergies   Allergen Reactions   • Latex      rash   • Seasonal Itching     Sinus pressure, itchy eyes, itchy throat         Physical Exam:   Constitutional: Well-developed, well-nourished, good hygiene. Appears stated age.  Cardiovascular: RRR, with no murmurs, rubs or gallops. No carotid bruits. No peripheral edema, pedal pulses intact.   Respiratory: Lungs CTA B/L, no W/R/R.   Skin: Warm, dry, intact. No rashes observed.  Eyes:    Funduscopic: Optic discs flat with no evidence of papilledema or pallor. Normal posterior segments.  Neurologic:   Mental Status: Awake, alert, oriented x 3.   Speech: Fluent with normal prosody.   Memory: Able to recall 3 words at 1 minute and 5 minutes. Able to recall recent and remote events accurately.    Concentration: Attentive. Able to focus on history and follow multi-step commands.              Fund of Knowledge:    Cranial Nerves:   Sensory: Intact light touch, vibration and temperature.    Coordination: No evidence of past-pointing on finger to nose testing, no dysdiadochokinesia. Heel to shin intact.             DTR's: 2+ throughout without clonus.    Babinski: Toes downgoing bilaterally.   Romberg: Negative.   Movements: No tremors observed.   Musculoskeletal:    Strength: 5/5 in upper and lower extremities bilaterally.   Gait: Steady, narrow  based.    Tone: Normal bulk and tone.   Joints: No swelling.     Labs reviewed:  Results for CHANDA JUNG (MRN [id number]) as of 4/15/2018 16:06   Ref. Range 2/27/2018 10:50   WBC Latest Ref Range: 3.4 - 10.8 x10E3/uL 7.2   RBC Latest Ref Range: 4.14 - 5.80 x10E6/uL 5.47   Hemoglobin Latest Ref Range: 13.0 - 17.7 g/dL 17.2   Hematocrit Latest Ref Range: 37.5 - 51.0 % 49.5   MCV Latest Ref Range: 79 - 97 fL 91   MCH Latest Ref Range: 26.6 - 33.0 pg 31.4   MCHC Latest Ref Range: 31.5 - 35.7 g/dL 34.7   RDW Latest Ref Range: 12.3 - 15.4 % 13.3   Platelet Count Latest Ref Range: 150 - 379 x10E3/uL 249   Immature Cells Unknown CANCELED   Neutrophils-Polys Latest Ref Range: Not Estab. % 59   Neutrophils (Absolute) Latest Ref Range: 1.4 - 7.0 x10E3/uL 4.3   Lymphocytes Latest Ref Range: Not Estab. % 31   Lymphs (Absolute) Latest Ref Range: 0.7 - 3.1 x10E3/uL 2.3   Monocytes Latest Ref Range: Not Estab. % 8   Monos (Absolute) Latest Ref Range: 0.1 - 0.9 x10E3/uL 0.6   Eosinophils Latest Ref Range: Not Estab. % 1   Eos (Absolute) Latest Ref Range: 0.0 - 0.4 x10E3/uL 0.1   Basophils Latest Ref Range: Not Estab. % 1   Baso (Absolute) Latest Ref Range: 0.0 - 0.2 x10E3/uL 0.0   Immature Granulocytes Latest Ref Range: Not Estab. % 0   Immature Granulocytes (abs) Latest Ref Range: 0.0 - 0.1 x10E3/uL 0.0   Nucleated RBC Unknown CANCELED   Comments-Diff Unknown CANCELED   Sodium Latest Ref Range: 134 - 144 mmol/L 138   Potassium Latest Ref Range: 3.5 - 5.2 mmol/L 4.4   Chloride Latest Ref Range: 96 - 106 mmol/L 98   Co2 Latest Ref Range: 18 - 29 mmol/L 24   Glucose Latest Ref Range: 65 - 99 mg/dL 64 (L)   Bun Latest Ref Range: 6 - 24 mg/dL 16   Creatinine Latest Ref Range: 0.76 - 1.27 mg/dL 1.20   GFR If  Latest Ref Range: >59 mL/min/1.73 83   GFR If Non  Latest Ref Range: >59 mL/min/1.73 72   Bun-Creatinine Ratio Latest Ref Range: 9 - 20  13   Calcium Latest Ref Range: 8.7 - 10.2 mg/dL 10.1  "  AST(SGOT) Latest Ref Range: 0 - 40 IU/L 23   ALT(SGPT) Latest Ref Range: 0 - 44 IU/L 26   Alkaline Phosphatase Latest Ref Range: 39 - 117 IU/L 90   Total Bilirubin Latest Ref Range: 0.0 - 1.2 mg/dL 1.3 (H)   Albumin Latest Ref Range: 3.5 - 5.5 g/dL 5.0   Total Protein Latest Ref Range: 6.0 - 8.5 g/dL 7.3   Globulin Latest Ref Range: 1.5 - 4.5 g/dL 2.3   A-G Ratio Latest Ref Range: 1.2 - 2.2  2.2   Lactic Acid Latest Ref Range: 4.8 - 25.7 mg/dL 10.5   Vitamin B12 -True Cobalamin Latest Ref Range: 232 - 1,245 pg/mL 1,074   Folate -Folic Acid Latest Ref Range: >3.0 ng/mL >19.9   TSH Latest Ref Range: 0.450 - 4.500 uIU/mL 1.060       Imaging:  Today I reviewed the patient's most recent MRI images with him in the examination room. I explained basic terminology of MRI's, verbalized my assessment, and answered his questions.     MRI of the brain from 3/15/18 with and without contrast  1.  MRI of the brain without and with contrast within normal limits.  2.  Incidental prominent Virchow-Erickson space in the left basal ganglia.     Assessment/Plan:  Vertigo  46 yo HIV positive male with episodes of a \"head rush\" sensation with negative ENT workup. Today we reviewed his brain MRI images together and there is nothing to suggest an etiology for his complaints. The description is not quite typical for vertigo. His lab studies returned as normal and he provided me with his most recent CD4 count and viral loads which do not indicate he is at high risk of AIDS defining illnesses. Since the episodes wake him up from sleep, we discussed the possibility of a vascular etiology and I recommended a 30 day loop monitor as well as a CTA of the head and neck.    Plan:  1. He will discuss a loop monitor with his PCP  2. CTA of the head and neck.       Kirstei Camara D.O., M.P.H  MS specialist.   Board Certified Neurologist.  Neurology Clerkship Director, [facility].    Neurology,  " [facility].   Tel: [phone]  Fax: [phone]

## 2018-04-10 ENCOUNTER — HOSPITAL ENCOUNTER (OUTPATIENT)
Dept: RADIOLOGY | Facility: MEDICAL CENTER | Age: 48
End: 2018-04-10
Attending: NURSE PRACTITIONER
Payer: COMMERCIAL

## 2018-04-10 DIAGNOSIS — M54.2 NECK PAIN: ICD-10-CM

## 2018-04-10 PROCEDURE — 72141 MRI NECK SPINE W/O DYE: CPT

## 2018-04-15 NOTE — ASSESSMENT & PLAN NOTE
"46 yo HIV positive male with episodes of a \"head rush\" sensation with negative ENT workup. Today we reviewed his brain MRI images together and there is nothing to suggest an etiology for his complaints. The description is not quite typical for vertigo. His lab studies returned as normal and he provided me with his most recent CD4 count and viral loads which do not indicate he is at high risk of AIDS defining illnesses. Since the episodes wake him up from sleep, we discussed the possibility of a vascular etiology and I recommended a 30 day loop monitor as well as a CTA of the head and neck.    Plan:  1. He will discuss a loop monitor with his PCP  2. CTA of the head and neck.   "

## 2018-04-17 ENCOUNTER — TELEPHONE (OUTPATIENT)
Dept: URGENT CARE | Facility: CLINIC | Age: 48
End: 2018-04-17

## 2018-04-17 DIAGNOSIS — M54.2 CHRONIC NECK PAIN: ICD-10-CM

## 2018-04-17 DIAGNOSIS — G89.29 CHRONIC NECK PAIN: ICD-10-CM

## 2018-04-17 NOTE — TELEPHONE ENCOUNTER
Attempted to notify patient by phone of MRI results. No answer. With detailed voice mail with results, and placed referral to spine specialist. He requested callback for any further questions or concerns. Patient is not registered for a [employer] account.

## 2018-04-23 ENCOUNTER — HOSPITAL ENCOUNTER (OUTPATIENT)
Dept: RADIOLOGY | Facility: MEDICAL CENTER | Age: 48
End: 2018-04-23
Attending: PSYCHIATRY & NEUROLOGY
Payer: COMMERCIAL

## 2018-04-23 DIAGNOSIS — R42 DIZZINESS: ICD-10-CM

## 2018-04-23 PROCEDURE — 70496 CT ANGIOGRAPHY HEAD: CPT

## 2018-04-23 PROCEDURE — 70498 CT ANGIOGRAPHY NECK: CPT

## 2018-04-23 PROCEDURE — 700117 HCHG RX CONTRAST REV CODE 255: Performed by: PSYCHIATRY & NEUROLOGY

## 2018-04-23 RX ADMIN — IOHEXOL 100 ML: 350 INJECTION, SOLUTION INTRAVENOUS at 08:12

## 2018-05-07 ENCOUNTER — OFFICE VISIT (OUTPATIENT)
Dept: CARDIOLOGY | Facility: MEDICAL CENTER | Age: 48
End: 2018-05-07
Payer: COMMERCIAL

## 2018-05-07 ENCOUNTER — OFFICE VISIT (OUTPATIENT)
Dept: NEUROLOGY | Facility: MEDICAL CENTER | Age: 48
End: 2018-05-07
Payer: COMMERCIAL

## 2018-05-07 VITALS
SYSTOLIC BLOOD PRESSURE: 110 MMHG | HEART RATE: 70 BPM | DIASTOLIC BLOOD PRESSURE: 70 MMHG | BODY MASS INDEX: 24.14 KG/M2 | HEIGHT: 69 IN | WEIGHT: 163 LBS | OXYGEN SATURATION: 97 %

## 2018-05-07 VITALS
BODY MASS INDEX: 24.44 KG/M2 | DIASTOLIC BLOOD PRESSURE: 68 MMHG | TEMPERATURE: 98 F | RESPIRATION RATE: 16 BRPM | OXYGEN SATURATION: 95 % | WEIGHT: 165 LBS | HEART RATE: 81 BPM | SYSTOLIC BLOOD PRESSURE: 110 MMHG | HEIGHT: 69 IN

## 2018-05-07 DIAGNOSIS — R42 DIZZINESS: ICD-10-CM

## 2018-05-07 DIAGNOSIS — R53.82 CHRONIC FATIGUE: ICD-10-CM

## 2018-05-07 DIAGNOSIS — R07.89 OTHER CHEST PAIN: ICD-10-CM

## 2018-05-07 DIAGNOSIS — R53.83 FATIGUE, UNSPECIFIED TYPE: ICD-10-CM

## 2018-05-07 DIAGNOSIS — R42 VERTIGO: ICD-10-CM

## 2018-05-07 PROCEDURE — 99204 OFFICE O/P NEW MOD 45 MIN: CPT | Performed by: INTERNAL MEDICINE

## 2018-05-07 PROCEDURE — 99214 OFFICE O/P EST MOD 30 MIN: CPT | Performed by: PSYCHIATRY & NEUROLOGY

## 2018-05-07 PROCEDURE — 93000 ELECTROCARDIOGRAM COMPLETE: CPT | Performed by: INTERNAL MEDICINE

## 2018-05-07 RX ORDER — LORAZEPAM 1 MG/1
TABLET ORAL
Refills: 0 | COMMUNITY
Start: 2018-04-17 | End: 2019-05-29

## 2018-05-07 ASSESSMENT — ENCOUNTER SYMPTOMS
WHEEZING: 0
ABDOMINAL PAIN: 0
BLURRED VISION: 0
BLOOD IN STOOL: 1
LOSS OF CONSCIOUSNESS: 0
NAUSEA: 0
INSOMNIA: 1
VOMITING: 0
PALPITATIONS: 0
EYE PAIN: 0
HEARTBURN: 1
FEVER: 0
HEMOPTYSIS: 0
CHILLS: 0
MYALGIAS: 0
CONSTIPATION: 1
DIARRHEA: 1
EYE DISCHARGE: 0
SPEECH CHANGE: 0
COUGH: 0
BRUISES/BLEEDS EASILY: 0
DEPRESSION: 0
NERVOUS/ANXIOUS: 1
DIZZINESS: 1

## 2018-05-07 ASSESSMENT — PATIENT HEALTH QUESTIONNAIRE - PHQ9: CLINICAL INTERPRETATION OF PHQ2 SCORE: 0

## 2018-05-07 NOTE — PROGRESS NOTES
"Chief Complaint   Patient presents with   • Dizziness     new patient   • Fatigue   • Chest Pain       Subjective:   Darrell Latham is a 47 y.o. male who presents today for evaluation of above issues.    He is seen at the request of Geeta Fernandez PA-C for above issues.    He has been evaluated recently for intermittent \"head rush\" and dizziness.  They occur randomly up to a few times a day. He denies associated palpitations, dyspnea or diaphoresis. He has not had any syncope.   He did have near syncope two years ago but was from dehydration (Na+ was low at the time).    He reported fatigue but able to run regularly without any limitations.   He reports occasional atypical chest pain (see below).      Past Medical History:   Diagnosis Date   • Bowel habit changes     constipation and diarrhea   • Heart burn    • HIV (human immunodeficiency virus infection) (HCC)     Diagnosed in [place], has never been on ART or had AIDS defining illness.   • Low testosterone in male      Past Surgical History:   Procedure Laterality Date   • FERNANDO BY LAPAROSCOPY  2/28/2018    Procedure: FERNANDO BY LAPAROSCOPY;  Surgeon: Bernadine James M.D.;  Location: SURGERY [facility];  Service: General     Family History   Problem Relation Age of Onset   • Alzheimer's Disease Maternal Uncle    • Alzheimer's Disease Paternal Grandmother    • Cancer Maternal Aunt      Kidney   • Heart Disease Neg Hx      Social History     Social History   • Marital status: Single     Spouse name: N/A   • Number of children: N/A   • Years of education: N/A     Occupational History   • Not on file.     Social History Main Topics   • Smoking status: Never Smoker   • Smokeless tobacco: Never Used   • Alcohol use 2.4 oz/week     4 Standard drinks or equivalent per week   • Drug use: No   • Sexual activity: Not Currently     Other Topics Concern   • Not on file     Social History Narrative   • No narrative on file     Allergies   Allergen Reactions   • " "Latex      rash   • Seasonal Itching     Sinus pressure, itchy eyes, itchy throat     Outpatient Encounter Prescriptions as of 5/7/2018   Medication Sig Dispense Refill   • LORazepam (ATIVAN) 1 MG Tab 1 TABLET AT BEDTIME AS NEEDED ONCE A DAY ORALLY 30 DAYS  0   • Multiple Vitamins-Minerals (AIRBORNE PO) Take  by mouth.     • pantoprazole (PROTONIX) 40 MG Tablet Delayed Response      • testosterone cypionate (DEPO-TESTOSTERONE) 200 MG/ML Solution injection 50 mg by Intramuscular route Once.     • docusate sodium (COLACE) 100 MG Cap TAKE 1 CAPSULE (100 MG) BY ORAL ROUTE 2 TIMES PER DAY PRN CONSTIPATION  0   • ondansetron (ZOFRAN ODT) 4 MG TABLET DISPERSIBLE Take 4 mg by mouth every 8 hours as needed for Nausea.     • raNITidine (ZANTAC) 150 MG Tab Take 150 mg by mouth as needed for Heartburn.     • hyoscyamine (ANASPAZ) 0.125 MG TABLET DISPERSIBLE Take 0.125 mg by mouth every 4 hours.     • therapeutic multivitamin-minerals (THERAGRAN-M) Tab Take 1 Tab by mouth every day.       No facility-administered encounter medications on file as of 5/7/2018.      Review of Systems   Constitutional: Positive for malaise/fatigue. Negative for chills and fever.   HENT: Positive for hearing loss and tinnitus. Negative for congestion.    Eyes: Negative for blurred vision, pain and discharge.   Respiratory: Negative for cough, hemoptysis and wheezing.    Cardiovascular: Positive for chest pain. Negative for palpitations.        Dull ache lasts hours to all day, non-exertional. Denies associated dyspnea, nausea or diaphoresis   Gastrointestinal: Positive for blood in stool, constipation, diarrhea and heartburn. Negative for abdominal pain, nausea and vomiting.   Musculoskeletal: Negative for joint pain and myalgias.   Skin: Negative for rash.   Neurological: Positive for dizziness. Negative for speech change and loss of consciousness.        \"Head rush\"   Endo/Heme/Allergies: Positive for environmental allergies. Does not bruise/bleed " "easily.   Psychiatric/Behavioral: Negative for depression. The patient is nervous/anxious and has insomnia.    All other systems reviewed and are negative.       Objective:   /70   Pulse 70   Ht 1.753 m (5' 9\")   Wt 73.9 kg (163 lb)   SpO2 97%   BMI 24.07 kg/m²     Physical Exam   Constitutional: He is oriented to person, place, and time. He appears well-developed and well-nourished.   Neck: No JVD present.   Cardiovascular: Normal rate and regular rhythm.  Exam reveals no gallop.    No murmur heard.  Pulmonary/Chest: Effort normal and breath sounds normal. No respiratory distress. He has no wheezes. He has no rales.   Abdominal: Soft. He exhibits no distension. There is no tenderness.   Musculoskeletal: He exhibits no edema.   Neurological: He is alert and oriented to person, place, and time.   Skin: Skin is warm and dry. No erythema.   Psychiatric: He has a normal mood and affect. His behavior is normal.     EKG today by my review showed sinus rhythm, borderline leftward axis, otherwise normal    ECHO July 2016 showed mild LVH and mild LA enlargement without any valve issue    Assessment:     1. Other chest pain  EKG   2. Fatigue, unspecified type  EKG    HOLTER MONITOR STUDY   3. Dizziness  EKG    HOLTER MONITOR STUDY       Medical Decision Making:  Today's Assessment / Status / Plan:     There is some concern that his symptoms could be related to cardiac arrhythmias.   He denies palpitations. He is experiencing those \"head rush\" pretty much daily.  Will rule out cardiac arrhythmias with 48 hour Holter monitor.  His chest pain is atypical. He has good exercise tolerance and no athersoclerotic risk factor.  We decided on monitor that for now.  We will keep you posted about our findings and further recommendations as they become available. Please also do not hesitate to call for any questions.  Thank you kindly for allowing me to participate in the care of this patient.  "

## 2018-05-07 NOTE — ASSESSMENT & PLAN NOTE
"46 yo HIV positive male with recurrent episodes of \"head rush\" sensation - negative brain MRI, ENT workup, and CTA of head and neck reviewed together today in the office also within normal limits. I would still like him to go for cardiology evaluation, but the frequency and stereotyped nature of these episodes warrant EEG testing. His PCP was concerned re: thoracic outlet syndrome given left arm numbness induced by exercise, but these episodes do not seem to be provoked.     Plan:  1. Routine Video EEG - followed by 3 days monitoring if routine is negative.   2. May send for autonomic testing if cardiology workup including loop monitor is unrevealing.   "

## 2018-05-07 NOTE — PROGRESS NOTES
"CC: Dizziness      HPI:    Darrell Latham is a 47 y.o. male who presents today in initial Neurological follow up for dizziness. He is unaccompanied to today's visit and was last seen on 3/29/18. He continues to experience the same \"head rush\" feeling of going from 0 to 60mph on a roller coaster several times a day. Sometimes, he says, it feels \"breath taking\". It feels odd and unsettling, but then it resolves. It lasts only 30 seconds to a minute and sometimes it wakes him up out of sleep. One episode occurred while he was driving. He has never lost consciousness with any of these episodes. Since his last visit, he has undergone CTA of the head and neck that was unrevealing. He has an appointment with cardiology scheduled for this afternoon.     He does mention that his primary care was concerned he may have thoracic outlet syndrome because he sometimes experiences left arm numbness/tingling after running which then resolves.     ROS:   Constitutional: No fevers or chills. +continued fatigue, reduced stamina.   Eyes: No blurry vision or eye pain.  ENT: No dysphagia or hearing loss.  Respiratory: No cough or shortness of breath.  Cardiovascular: No chest pain or palpitations.  GI: No nausea, vomiting, or diarrhea.  : No urinary incontinence or dysuria.  Musculoskeletal: No joint swelling or arthralgias.  Skin: No skin rashes.  Neuro: No headaches, + dizziness, or tremors.  Endocrine: No heat or cold intolerance. No polydipsia or polyuria.  Psych: No depression or anxiety.  Heme/Lymph: No easy bruising or swollen lymph nodes.      Past Medical History:   Past Medical History:   Diagnosis Date   • Bowel habit changes     constipation and diarrhea   • Heart burn    • HIV (human immunodeficiency virus infection) (HCC)     Diagnosed in [place], has never been on ART or had AIDS defining illness.   • Low testosterone in male        Past Surgical History:   Past Surgical History:   Procedure Laterality Date   • " FERNANDO BY LAPAROSCOPY  2/28/2018    Procedure: FERNANDO BY LAPAROSCOPY;  Surgeon: Bernadine James M.D.;  Location: SURGERY [facility];  Service: General       Social History:   Social History     Social History   • Marital status: Single     Spouse name: N/A   • Number of children: N/A   • Years of education: N/A     Occupational History   • Not on file.     Social History Main Topics   • Smoking status: Never Smoker   • Smokeless tobacco: Never Used   • Alcohol use 2.4 oz/week     4 Standard drinks or equivalent per week   • Drug use: No   • Sexual activity: Not Currently     Other Topics Concern   • Not on file     Social History Narrative   • No narrative on file       Allergies:   Allergies   Allergen Reactions   • Latex      rash   • Seasonal Itching     Sinus pressure, itchy eyes, itchy throat         Physical Exam:   Constitutional: Well-developed, well-nourished, good hygiene. Appears stated age.  Cardiovascular: RRR, with no murmurs, rubs or gallops. No carotid bruits.   Respiratory: Lungs CTA B/L, no W/R/R.   Abdomen: Soft Non-tender to Palpation. Non-distended.  Extremities: No peripheral edema, pedal pulses intact.   Skin: Warm, dry, intact. No rashes observed.    Eyes: Sclera anicteric   Funduscopic: Optic discs flat with no evidence of papilledema or pallor. Normal             posterior segments.  Neurologic:   Mental Status: Awake, alert, oriented x 3.   Speech: Fluent with normal prosody.   Memory: Able to recall 3 words at 1 minute and 5 minutes. Able to recall recent             and remote events accurately.    Concentration: Attentive. Able to focus on history and follow multi-step commands.              Fund of Knowledge: Appropriate   Cranial Nerves:    CN II: PERRL. No afferent pupillary defect.    CN III, IV, VI: Good eye closure, EOMI.     CN V: Facial sensation intact and symmetric.     CN VII: No facial asymmetry.     CN VIII: Hearing intact.     CN IX and X: Palate elevates  "symmetrically. Normal gag reflex.    CN XI: Symmetric shoulder shrug.     CN XII: Tongue midline.    Sensory: Intact light touch, vibration and temperature.    Coordination: No evidence of past-pointing on finger to nose testing, no dysdiadochokinesia. Heel to shin intact.             DTR's: 2+ throughout without clonus.    Babinski: Toes downgoing bilaterally.   Romberg: Negative.   Movements: No tremors observed.   Musculoskeletal:    Strength: 5/5 in upper and lower extremities bilaterally.   Gait: Steady, narrow based.    Tone: Normal bulk and tone.   Joints: No swelling.     Imaging:   Today I reviewed the patient's most recent MRI images with him in the examination room. I explained basic terminology of MRI's, verbalized my assessment, and answered his questions.     CTA Head from 4/23/18  CT angiogram of the [place] within normal limits.    CTA Neck from 4/23/18  CT angiogram of the neck within normal limits.       Assessment/Plan:  Vertigo  48 yo HIV positive male with recurrent episodes of \"head rush\" sensation - negative brain MRI, ENT workup, and CTA of head and neck reviewed together today in the office also within normal limits. I would still like him to go for cardiology evaluation, but the frequency and stereotyped nature of these episodes warrant EEG testing. His PCP was concerned re: thoracic outlet syndrome given left arm numbness induced by exercise, but these episodes do not seem to be provoked.     Plan:  1. Routine Video EEG - followed by 3 days monitoring if routine is negative.   2. May send for autonomic testing if cardiology workup including loop monitor is unrevealing.       Kirstie Camara D.O., M.P.H  MS specialist.   Board Certified Neurologist.  Neurology Clerkship Director, [facility].    Neurology,  [facility].   Tel: [phone]  Fax: [phone]    "

## 2018-05-07 NOTE — LETTER
[facility]   [address] [place] [address]  [place] [address]  Phone: [phone]  Fax: [phone]              Darrell Latham  1970    Encounter Date: 5/7/2018    Leanna See M.D.          CARDIOLOGY CONSULTATION NOTE:  No notes on file      No Recipients

## 2018-05-08 LAB — EKG IMPRESSION: NORMAL

## 2018-05-21 ENCOUNTER — TELEPHONE (OUTPATIENT)
Dept: NEUROLOGY | Facility: MEDICAL CENTER | Age: 48
End: 2018-05-21

## 2018-05-21 ENCOUNTER — NON-PROVIDER VISIT (OUTPATIENT)
Dept: CARDIOLOGY | Facility: MEDICAL CENTER | Age: 48
End: 2018-05-21
Payer: COMMERCIAL

## 2018-05-21 DIAGNOSIS — I49.1 PREMATURE ATRIAL CONTRACTION: ICD-10-CM

## 2018-05-21 DIAGNOSIS — R53.83 OTHER FATIGUE: ICD-10-CM

## 2018-05-21 DIAGNOSIS — R07.89 OTHER CHEST PAIN: ICD-10-CM

## 2018-05-21 DIAGNOSIS — R42 DIZZINESS: ICD-10-CM

## 2018-05-21 DIAGNOSIS — I49.3 PVC (PREMATURE VENTRICULAR CONTRACTION): ICD-10-CM

## 2018-05-21 NOTE — TELEPHONE ENCOUNTER
Patient called concerned because he went to cardiology today and had a mcrae monitor put in that he must wear for 48 hrs and wanted to know if it will interfere with the veeg test he's having done tomorrow. I spoke with Nancy richard tech and she stated it will be fine I did tell patient but he didn't feel comfortable with the testing and having the monitor on so he stated he will r/s his veeg.

## 2018-05-22 ENCOUNTER — APPOINTMENT (OUTPATIENT)
Dept: NEUROLOGY | Facility: MEDICAL CENTER | Age: 48
End: 2018-05-22
Payer: COMMERCIAL

## 2018-05-24 DIAGNOSIS — R07.89 OTHER CHEST PAIN: ICD-10-CM

## 2018-05-30 ENCOUNTER — OFFICE VISIT (OUTPATIENT)
Dept: CARDIOLOGY | Facility: MEDICAL CENTER | Age: 48
End: 2018-05-30
Payer: COMMERCIAL

## 2018-05-30 VITALS
DIASTOLIC BLOOD PRESSURE: 74 MMHG | HEART RATE: 90 BPM | SYSTOLIC BLOOD PRESSURE: 102 MMHG | WEIGHT: 166 LBS | HEIGHT: 69 IN | BODY MASS INDEX: 24.59 KG/M2 | OXYGEN SATURATION: 95 %

## 2018-05-30 DIAGNOSIS — R42 DIZZINESS: ICD-10-CM

## 2018-05-30 DIAGNOSIS — R07.89 OTHER CHEST PAIN: ICD-10-CM

## 2018-05-30 LAB — EKG IMPRESSION: NORMAL

## 2018-05-30 PROCEDURE — 99213 OFFICE O/P EST LOW 20 MIN: CPT | Performed by: NURSE PRACTITIONER

## 2018-05-30 PROCEDURE — 93224 XTRNL ECG REC UP TO 48 HRS: CPT | Performed by: INTERNAL MEDICINE

## 2018-05-30 ASSESSMENT — ENCOUNTER SYMPTOMS
FEVER: 0
ORTHOPNEA: 0
PND: 0
SHORTNESS OF BREATH: 0
ABDOMINAL PAIN: 0
MYALGIAS: 0
CLAUDICATION: 0
COUGH: 0
PALPITATIONS: 0
DIZZINESS: 1

## 2018-05-30 NOTE — PROGRESS NOTES
"Chief Complaint   Patient presents with   • Dizziness     discuss results for holter monitor, head rushes&fatigue(7mnths-daily), consistently on planes traveling for work       Subjective:   Darrell Latham is a 47 y.o. male who presents today for follow-up on his dizziness and his \"head rush\" sensation.    Patient of Dr. See.  Patient was last seen in clinic on 5/7/2018.  Patient was sent for a Holter monitor to evaluate for dizziness and \"head rush sensation\".  Patient was also referred over to orthopedic to evaluate for other causes.  Patient is currently being evaluated for thoracic outlet syndrome.    Patient continues to have his dizziness and \"head rush\" sensation.  Patient also continues to report a dull ache sensation in his mid chest with no radiation and does not occur with exertion.    Patient denies shortness of breath, palpitations, diaphoresis dizziness/lightheadedness, orthopnea, PND or Edema.       Past Medical History:   Diagnosis Date   • Bowel habit changes     constipation and diarrhea   • Heart burn    • HIV (human immunodeficiency virus infection) (HCC)     Diagnosed in [place], has never been on ART or had AIDS defining illness.   • Low testosterone in male      Past Surgical History:   Procedure Laterality Date   • FERNANDO BY LAPAROSCOPY  2/28/2018    Procedure: FERNANDO BY LAPAROSCOPY;  Surgeon: Bernadine James M.D.;  Location: SURGERY [facility];  Service: General     Family History   Problem Relation Age of Onset   • Alzheimer's Disease Maternal Uncle    • Alzheimer's Disease Paternal Grandmother    • GI Father      CIrrhosis   • Cancer Maternal Aunt      Kidney   • Heart Disease Neg Hx      Social History     Social History   • Marital status: Single     Spouse name: N/A   • Number of children: N/A   • Years of education: N/A     Occupational History   • Not on file.     Social History Main Topics   • Smoking status: Never Smoker   • Smokeless tobacco: Never Used   • " "Alcohol use 2.4 oz/week     4 Standard drinks or equivalent per week      Comment: OCC.    • Drug use: No   • Sexual activity: Not Currently     Other Topics Concern   • Not on file     Social History Narrative   • No narrative on file     Allergies   Allergen Reactions   • Latex      rash   • Seasonal Itching     Sinus pressure, itchy eyes, itchy throat     Outpatient Encounter Prescriptions as of 5/30/2018   Medication Sig Dispense Refill   • Non Formulary Request [facility] BRAND FOR FLONASE-PRN     • Multiple Vitamins-Minerals (AIRBORNE PO) Take  by mouth.     • testosterone cypionate (DEPO-TESTOSTERONE) 200 MG/ML Solution injection 50 mg by Intramuscular route Once.     • therapeutic multivitamin-minerals (THERAGRAN-M) Tab Take 1 Tab by mouth every day.     • LORazepam (ATIVAN) 1 MG Tab 1 TABLET AT BEDTIME AS NEEDED ONCE A DAY ORALLY 30 DAYS  0   • [DISCONTINUED] docusate sodium (COLACE) 100 MG Cap TAKE 1 CAPSULE (100 MG) BY ORAL ROUTE 2 TIMES PER DAY PRN CONSTIPATION  0   • [DISCONTINUED] pantoprazole (PROTONIX) 40 MG Tablet Delayed Response      • ondansetron (ZOFRAN ODT) 4 MG TABLET DISPERSIBLE Take 4 mg by mouth every 8 hours as needed for Nausea.     • [DISCONTINUED] raNITidine (ZANTAC) 150 MG Tab Take 150 mg by mouth as needed for Heartburn.     • [DISCONTINUED] hyoscyamine (ANASPAZ) 0.125 MG TABLET DISPERSIBLE Take 0.125 mg by mouth every 4 hours.       No facility-administered encounter medications on file as of 5/30/2018.      Review of Systems   Constitutional: Positive for malaise/fatigue. Negative for fever.   Respiratory: Negative for cough and shortness of breath.    Cardiovascular: Negative for chest pain, palpitations, orthopnea, claudication, leg swelling and PND.   Gastrointestinal: Negative for abdominal pain.   Musculoskeletal: Negative for myalgias.   Neurological: Positive for dizziness (dizziness and \"head rush\").   All other systems reviewed and are negative.       Objective:   BP " "102/74   Pulse 90   Ht 1.753 m (5' 9\")   Wt 75.3 kg (166 lb)   SpO2 95%   BMI 24.51 kg/m²     Physical Exam   Constitutional: He is oriented to person, place, and time. He appears well-developed and well-nourished.   HENT:   Head: Normocephalic and atraumatic.   Eyes: EOM are normal. Pupils are equal, round, and reactive to light.   Neck: Normal range of motion. Neck supple. No JVD present.   Cardiovascular: Normal rate, regular rhythm and normal heart sounds.    Pulmonary/Chest: Effort normal and breath sounds normal. No respiratory distress. He has no wheezes. He has no rales.   Musculoskeletal: He exhibits no edema.   Neurological: He is alert and oriented to person, place, and time.   Skin: Skin is warm and dry.   Psychiatric: He has a normal mood and affect. His behavior is normal.      Lab Results   Component Value Date/Time    CHOLSTRLTOT 161 07/20/2016 01:02 AM    LDL 89 07/20/2016 01:02 AM    HDL 54 07/20/2016 01:02 AM    TRIGLYCERIDE 92 07/20/2016 01:02 AM       Lab Results   Component Value Date/Time    SODIUM 138 02/27/2018 10:50 AM    SODIUM 136 02/27/2018 08:19 AM    POTASSIUM 4.4 02/27/2018 10:50 AM    POTASSIUM 4.3 02/27/2018 08:19 AM    CHLORIDE 98 02/27/2018 10:50 AM    CHLORIDE 100 02/27/2018 08:19 AM    CO2 24 02/27/2018 10:50 AM    CO2 25 02/27/2018 08:19 AM    GLUCOSE 64 (L) 02/27/2018 10:50 AM    GLUCOSE 138 (H) 02/27/2018 08:19 AM    BUN 16 02/27/2018 10:50 AM    BUN 17 02/27/2018 08:19 AM    CREATININE 1.20 02/27/2018 10:50 AM    CREATININE 1.22 02/27/2018 08:19 AM    BUNCREATRAT 13 02/27/2018 10:50 AM     Lab Results   Component Value Date/Time    ALKPHOSPHAT 90 02/27/2018 10:50 AM    ALKPHOSPHAT 81 08/22/2017 09:00 AM    ASTSGOT 23 02/27/2018 10:50 AM    ASTSGOT 25 08/22/2017 09:00 AM    ALTSGPT 26 02/27/2018 10:50 AM    ALTSGPT 23 08/22/2017 09:00 AM    TBILIRUBIN 1.3 (H) 02/27/2018 10:50 AM    TBILIRUBIN 1.5 08/22/2017 09:00 AM      Transthoracic Echo Report 7/19/16  Mild concentric " left ventricular hypertrophy.  Mildly dilated left atrium.  Normal left ventricular chamber size.  Normal left ventricular systolic function.  Normal regional wall motion.  Aortic sclerosis without stenosis.  Structurally normal mitral valve without significant stenosis or   regurgitation.    Myocardial Perfusion Report 7/20/16   NUCLEAR IMAGING INTERPRETATION   1. No evidence of significant jeopardized viable myocardium or prior    myocardial infarction.    2. Normal left ventricular size, ejection fraction, and wall motion.  The estimated ejection fraction is 68 %. Calculated TID 0.93.   ECG INTERPRETATION   Negative stress ECG for ischemia.    CTA of Neck 4/23/18  FINDINGS:  The arch origins of the great vessels appear intact. The aortic arch shows no abnormality.    The right common carotid artery, cervical carotid bifurcation, and cervical internal carotid artery are within normal limits. There is no evidence of significant stenosis, thrombus, or other filling defect or ulceration. There is no evidence of   dissection or aneurysm.    The left common carotid artery, cervical carotid bifurcation, and cervical internal carotid artery are within normal limits. There is no evidence of significant stenosis, thrombus, or other filling defect or ulceration. There is no evidence of dissection   or aneurysm.    The cervical vertebral arteries are normal bilaterally.    The neck soft tissues and lung apices in the field of view are unremarkable.     Impression     CT angiogram of the neck within normal limits.       CTA Head 4/23/18  FINDINGS:  The posterior circulation shows the distal vertebral arteries to be patent. The vertebrobasilar confluence is intact. The basilar artery is patent. No aneurysm or occlusive lesion is evident.    The anterior circulation shows no stenotic or occlusive lesion. No aneurysm is evident about the [place].    The brain is unremarkable. A prominent perivascular space once again  noted in the left basal ganglia.    A mucous retention cyst or polyp identified in the left maxillary sinus. The visualized mastoid air cells are well aerated.   Impression   CT angiogram of the [place] within normal limits.       Holter Monitor 5/21/18 -reviewed results with patient  Interpretive Statements   Basic rhythm is sinus   Rare isolated PAC and very rare PVCs   Sinus rhythm was observed during reported symptoms     Assessment:     1. Dizziness     2. Other chest pain       Medical Decision Making:  Today's Assessment / Status / Plan:   1. Dizziness:  -Holter monitor did not show any signs of arrhythmia, pauses. Only rare PAC, PVC.   -Patient to continue to follow-up with up with orthopedics and look into thoracic outlet syndrome.  Patient will possibly need referral to vascular.    2. A typical chest pain:  -will continue to monitor    Will request records from [facility].     FU in clinic in 2 months with Dr. MITUL Ruano if needed.    Patient verbalizes understanding and agrees with the plan of care.     Collaborating MD: Antwan Cagle MD

## 2018-06-08 ENCOUNTER — SLEEP CENTER VISIT (OUTPATIENT)
Dept: SLEEP MEDICINE | Facility: MEDICAL CENTER | Age: 48
End: 2018-06-08
Payer: COMMERCIAL

## 2018-06-08 VITALS
WEIGHT: 168 LBS | HEART RATE: 68 BPM | SYSTOLIC BLOOD PRESSURE: 110 MMHG | OXYGEN SATURATION: 94 % | RESPIRATION RATE: 16 BRPM | DIASTOLIC BLOOD PRESSURE: 80 MMHG | BODY MASS INDEX: 24.88 KG/M2 | HEIGHT: 69 IN | TEMPERATURE: 98.2 F

## 2018-06-08 DIAGNOSIS — F51.01 PRIMARY INSOMNIA: ICD-10-CM

## 2018-06-08 DIAGNOSIS — G47.33 OSA (OBSTRUCTIVE SLEEP APNEA): ICD-10-CM

## 2018-06-08 PROCEDURE — 99204 OFFICE O/P NEW MOD 45 MIN: CPT | Performed by: INTERNAL MEDICINE

## 2018-06-08 RX ORDER — FLUTICASONE PROPIONATE 50 MCG
1 SPRAY, SUSPENSION (ML) NASAL DAILY
COMMUNITY
End: 2019-05-29

## 2018-06-08 RX ORDER — ZOLPIDEM TARTRATE 5 MG/1
5 TABLET ORAL NIGHTLY PRN
Qty: 3 TAB | Refills: 0 | Status: SHIPPED | OUTPATIENT
Start: 2018-06-08 | End: 2018-06-29

## 2018-06-08 NOTE — PROGRESS NOTES
Chief Complaint   Patient presents with   • Establish Care     REF BY MJ/ IN CHART/ NO PRIOR SS OR SLEEP PROVIDER/ DX: INSOMNIA       HPI:  The patient is a 47-year-old man who has had sleep issues for many years.  He is always been troubled by some degree of insomnia.  Sleep initiation is usually okay however, if he wakes up during the night he has difficulty getting back to sleep.  This is been going on for a number of years and has worsened recently.  At the present time if he is sleeping well he will usually get up at 4 AM.  He likes to workout at the gym early.  He then comes home for breakfast and to take care of the dog.  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  Dinner at 7.  He goes to bed anywhere from 8:30-9:30.  He falls asleep reasonably well but will wake up at midnight and then again wake up about 2:00 am and cannot get back to sleep.  At that time he often is concerned because he knows that he cannot sleep he will have a bad day and be excessively tired.  He is always had somewhat of a difficulty falling back to sleep.  However in the past he worked at home and was not concerned about meeting a schedule.  He had less anxiety surrounding his sleep pattern.    More recently he is feeling tired even if he slept okay.  He has awakened at times with gasping respirations.  He has been told that he snores.  He is not aware of being told about witnessed apneas.  He has never been diagnosed with obstructive sleep apnea.  There is no history to suggest narcolepsy or movement disorder.    Past Medical History:   Diagnosis Date   • Bowel habit changes     constipation and diarrhea   • Chickenpox    • Heart burn    • HIV (human immunodeficiency virus infection) (HCC)     Diagnosed in [place], has never been on ART or had AIDS defining illness.   • Low testosterone in male        ROS:   Constitutional: Denies fevers, chills, night sweats, fatigue or weight loss  Eyes: Denies  vision loss, pain, drainage, double vision  Ears, Nose, Throat: Denies earache, tinnitus, hoarseness  Cardiovascular: Denies chest pain, tightness, palpitations  Respiratory: Denies shortness of breath, sputum production, cough, hemoptysis  Sleep: See HPI  GI: Denies abdominal pain, nausea, vomiting, diarrhea  : Denies frequent urination, hematuria, painful urination  Musculoskeletal: Denies back pain, painful joints, sore muscles  Neurological: Denies headaches, seizures  Skin: Denies rashes, color changes  Psychiatric: Denies depression or thoughts of suicide  Hematologic: Denies bleeding tendency or clotting tendency  Allergic/Immunologic: Denies rhinitis, skin sensitivity    Social History     Social History   • Marital status: Single     Spouse name: N/A   • Number of children: N/A   • Years of education: N/A     Occupational History   • Not on file.     Social History Main Topics   • Smoking status: Never Smoker   • Smokeless tobacco: Never Used   • Alcohol use 2.4 oz/week     4 Standard drinks or equivalent per week      Comment: OCC.    • Drug use: No   • Sexual activity: Not Currently     Other Topics Concern   • Not on file     Social History Narrative   • No narrative on file     Latex and Seasonal  Current Outpatient Prescriptions on File Prior to Visit   Medication Sig Dispense Refill   • LORazepam (ATIVAN) 1 MG Tab 1 TABLET AT BEDTIME AS NEEDED ONCE A DAY ORALLY 30 DAYS  0   • Multiple Vitamins-Minerals (AIRBORNE PO) Take  by mouth.     • testosterone cypionate (DEPO-TESTOSTERONE) 200 MG/ML Solution injection 50 mg by Intramuscular route Once.     • therapeutic multivitamin-minerals (THERAGRAN-M) Tab Take 1 Tab by mouth every day.     • Non Formulary Request [facility] GENER[facility] BRAND FOR FLONASE-PRN     • ondansetron (ZOFRAN ODT) 4 MG TABLET DISPERSIBLE Take 4 mg by mouth every 8 hours as needed for Nausea.       No current facility-administered medications on file prior to visit.      Blood pressure 110/80,  "pulse 68, temperature 36.8 °C (98.2 °F), resp. rate 16, height 1.753 m (5' 9\"), weight 76.2 kg (168 lb), SpO2 94 %.  Family History   Problem Relation Age of Onset   • Alzheimer's Disease Maternal Uncle    • Alzheimer's Disease Paternal Grandmother    • GI Father      CIrrhosis   • Cancer Maternal Aunt      Kidney   • Heart Disease Neg Hx        Physical Exam:    HEENT: PERRLA, EOMI, no scleral icterus, no nasal or oral lesions  Neck: No thyromegaly, no adenopathy, no bruits  Mallampatti: Grade II  Lungs: Equal breath sounds, no wheezes or crackles  Heart: Regular rate and rhythm, no gallops or murmurs  Abdomen: Soft, benign, no organomegaly  Extremities: No clubbing, cyanosis, or edema  Neurologic: Cranial nerve, motor, and sensory exam are normal    1. BUTCH (obstructive sleep apnea)    2. Primary insomnia        This man clearly has some element of insomnia that is primarily sleep maintenance.  We did discuss sleep hygiene and insomnia including anxiety surrounding the need to obtain sufficient sleep.  However in addition he has daytime somnolence even after sleeping at night and has a history of snoring and gasping that suggest a strong possibility of underlying obstructive sleep apnea.  We will obtain an overnight polysomnogram to make sure that he does not have severe obstructive sleep apnea.  Home study in someone with insomnia would not be very effective.  We will see him after his polysomnogram and go over results.  In addition to any treatment that may be directed toward obstructive sleep apnea he may benefit from cognitive behavioral therapy concerning his insomnia.  "

## 2018-06-08 NOTE — LETTER
Aman Stallworth M.D.  [facility]   [address] [place] [address]  Phone: [phone] - Fax: [phone]           Encounter Date: 6/8/2018  Provider: Aman Stallworth M.D.  Location of Care: [facility]      Patient:   Darrell Latham   MR Number: [id number]   YOB: 1970     PROGRESS NOTE:  Chief Complaint   Patient presents with   • Establish Care     REF BY MJ/ IN CHART/ NO PRIOR SS OR SLEEP PROVIDER/ DX: INSOMNIA       HPI:  The patient is a 47-year-old man who has had sleep issues for many years.  He is always been troubled by some degree of insomnia.  Sleep initiation is usually okay however, if he wakes up during the night he has difficulty getting back to sleep.  This is been going on for a number of years and has worsened recently.  At the present time if he is sleeping well he will usually get up at 4 AM.  He likes to workout at the gym early.  He then comes home for breakfast and to take care of the dog.  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  Dinner at 7.  He goes to bed anywhere from 8:30-9:30.  He falls asleep reasonably well but will wake up at midnight and then again wake up about 2:00 am and cannot get back to sleep.  At that time he often is concerned because he knows that he cannot sleep he will have a bad day and be excessively tired.  He is always had somewhat of a difficulty falling back to sleep.  However in the past he worked at home and was not concerned about meeting a schedule.  He had less anxiety surrounding his sleep pattern.    More recently he is feeling tired even if he slept okay.  He has awakened at times with gasping respirations.  He has been told that he snores.  He is not aware of being told about witnessed apneas.  He has never been diagnosed with obstructive sleep apnea.  There is no history to suggest narcolepsy or movement  disorder.    Past Medical History:   Diagnosis Date   • Bowel habit changes     constipation and diarrhea   • Chickenpox    • Heart burn    • HIV (human immunodeficiency virus infection) (HCC)     Diagnosed in [place], has never been on ART or had AIDS defining illness.   • Low testosterone in male        ROS:   Constitutional: Denies fevers, chills, night sweats, fatigue or weight loss  Eyes: Denies vision loss, pain, drainage, double vision  Ears, Nose, Throat: Denies earache, tinnitus, hoarseness  Cardiovascular: Denies chest pain, tightness, palpitations  Respiratory: Denies shortness of breath, sputum production, cough, hemoptysis  Sleep: See HPI  GI: Denies abdominal pain, nausea, vomiting, diarrhea  : Denies frequent urination, hematuria, painful urination  Musculoskeletal: Denies back pain, painful joints, sore muscles  Neurological: Denies headaches, seizures  Skin: Denies rashes, color changes  Psychiatric: Denies depression or thoughts of suicide  Hematologic: Denies bleeding tendency or clotting tendency  Allergic/Immunologic: Denies rhinitis, skin sensitivity    Social History     Social History   • Marital status: Single     Spouse name: N/A   • Number of children: N/A   • Years of education: N/A     Occupational History   • Not on file.     Social History Main Topics   • Smoking status: Never Smoker   • Smokeless tobacco: Never Used   • Alcohol use 2.4 oz/week     4 Standard drinks or equivalent per week      Comment: OCC.    • Drug use: No   • Sexual activity: Not Currently     Other Topics Concern   • Not on file     Social History Narrative   • No narrative on file     Latex and Seasonal  Current Outpatient Prescriptions on File Prior to Visit   Medication Sig Dispense Refill   • LORazepam (ATIVAN) 1 MG Tab 1 TABLET AT BEDTIME AS NEEDED ONCE A DAY ORALLY 30 DAYS  0   • Multiple Vitamins-Minerals (AIRBORNE PO) Take  by mouth.     • testosterone cypionate (DEPO-TESTOSTERONE) 200 MG/ML Solution  "injection 50 mg by Intramuscular route Once.     • therapeutic multivitamin-minerals (THERAGRAN-M) Tab Take 1 Tab by mouth every day.     • Non Formulary Request [facility] GENERIC BRAND FOR FLONASE-PRN     • ondansetron (ZOFRAN ODT) 4 MG TABLET DISPERSIBLE Take 4 mg by mouth every 8 hours as needed for Nausea.       No current facility-administered medications on file prior to visit.      Blood pressure 110/80, pulse 68, temperature 36.8 °C (98.2 °F), resp. rate 16, height 1.753 m (5' 9\"), weight 76.2 kg (168 lb), SpO2 94 %.  Family History   Problem Relation Age of Onset   • Alzheimer's Disease Maternal Uncle    • Alzheimer's Disease Paternal Grandmother    • GI Father      CIrrhosis   • Cancer Maternal Aunt      Kidney   • Heart Disease Neg Hx        Physical Exam:    HEENT: PERRLA, EOMI, no scleral icterus, no nasal or oral lesions  Neck: No thyromegaly, no adenopathy, no bruits  Mallampatti: Grade II  Lungs: Equal breath sounds, no wheezes or crackles  Heart: Regular rate and rhythm, no gallops or murmurs  Abdomen: Soft, benign, no organomegaly  Extremities: No clubbing, cyanosis, or edema  Neurologic: Cranial nerve, motor, and sensory exam are normal    1. BUTCH (obstructive sleep apnea)    2. Primary insomnia        This man clearly has some element of insomnia that is primarily sleep maintenance.  We did discuss sleep hygiene and insomnia including anxiety surrounding the need to obtain sufficient sleep.  However in addition he has daytime somnolence even after sleeping at night and has a history of snoring and gasping that suggest a strong possibility of underlying obstructive sleep apnea.  We will obtain an overnight polysomnogram to make sure that he does not have severe obstructive sleep apnea.  Home study in someone with insomnia would not be very effective.  We will see him after his polysomnogram and go over results.  In addition to any treatment that may be directed toward obstructive sleep apnea he may " benefit from cognitive behavioral therapy concerning his insomnia.      Electronically signed by Aman Stallworth M.D.  on 06/08/18    No Recipients

## 2018-06-11 ENCOUNTER — TELEPHONE (OUTPATIENT)
Dept: SLEEP MEDICINE | Facility: MEDICAL CENTER | Age: 48
End: 2018-06-11

## 2018-06-11 DIAGNOSIS — G47.33 OSA (OBSTRUCTIVE SLEEP APNEA): ICD-10-CM

## 2018-06-11 NOTE — TELEPHONE ENCOUNTER
DENIED BY [facility] FIRST MEDICAL DIRECTOR DR PADDY DIAZ.  PATIENT DOESN'T MEET CRITERIA FOR ATTENDED SLEEP STUDY.    HST DOESN'T REQUIRE AUTH     PEER TO PEER AVAILABLE - CALL [phone]  CASE# [id number]    Routed to [place] and NP pool.

## 2018-06-11 NOTE — TELEPHONE ENCOUNTER
Let's attempt HST. If patient's insomnia prevents him from completing valid study at home, will then attempt in lab diagnostic.  May use Ambien provided for in lab study for HST.  Order signed.

## 2018-06-15 ENCOUNTER — APPOINTMENT (OUTPATIENT)
Dept: SLEEP MEDICINE | Facility: MEDICAL CENTER | Age: 48
End: 2018-06-15
Attending: INTERNAL MEDICINE
Payer: COMMERCIAL

## 2018-07-02 ENCOUNTER — APPOINTMENT (OUTPATIENT)
Dept: SLEEP MEDICINE | Facility: MEDICAL CENTER | Age: 48
End: 2018-07-02
Attending: NURSE PRACTITIONER
Payer: COMMERCIAL

## 2018-07-16 ENCOUNTER — HOME STUDY (OUTPATIENT)
Dept: SLEEP MEDICINE | Facility: MEDICAL CENTER | Age: 48
End: 2018-07-16
Attending: NURSE PRACTITIONER
Payer: COMMERCIAL

## 2018-07-16 DIAGNOSIS — G47.33 OSA (OBSTRUCTIVE SLEEP APNEA): ICD-10-CM

## 2018-07-16 PROCEDURE — 95806 SLEEP STUDY UNATT&RESP EFFT: CPT | Performed by: INTERNAL MEDICINE

## 2018-08-09 ENCOUNTER — SLEEP CENTER VISIT (OUTPATIENT)
Dept: SLEEP MEDICINE | Facility: MEDICAL CENTER | Age: 48
End: 2018-08-09
Payer: COMMERCIAL

## 2018-08-09 VITALS
OXYGEN SATURATION: 97 % | HEIGHT: 69 IN | RESPIRATION RATE: 16 BRPM | SYSTOLIC BLOOD PRESSURE: 118 MMHG | HEART RATE: 80 BPM | WEIGHT: 166 LBS | BODY MASS INDEX: 24.59 KG/M2 | DIASTOLIC BLOOD PRESSURE: 72 MMHG

## 2018-08-09 DIAGNOSIS — G47.33 OSA (OBSTRUCTIVE SLEEP APNEA): ICD-10-CM

## 2018-08-09 DIAGNOSIS — F51.01 PRIMARY INSOMNIA: ICD-10-CM

## 2018-08-09 DIAGNOSIS — R53.82 CHRONIC FATIGUE: ICD-10-CM

## 2018-08-09 PROCEDURE — 99214 OFFICE O/P EST MOD 30 MIN: CPT | Performed by: INTERNAL MEDICINE

## 2018-08-09 RX ORDER — ALPRAZOLAM 0.25 MG/1
1 TABLET ORAL NIGHTLY PRN
COMMUNITY
End: 2022-02-14

## 2018-08-09 RX ORDER — ESZOPICLONE 3 MG/1
3 TABLET, FILM COATED ORAL
COMMUNITY
End: 2022-02-14

## 2018-08-09 NOTE — PROCEDURES
Summary:    This home sleep study was recorded using an ApneaLink Air Type III device and was performed on 7/16/2018. The recording duration was 8 hours and 15 minutes, the monitoring time for flow duration was 4 hours and 8 minutes, and the oxygen saturation evaluation duration was 8 hours and 1 minute.    The patient experienced 1 apneas and 28 hypopneas. The Respiratory Event Index (ALINE) was 7.0, the AI was 0.2, the HI was 6.8, and the Central Index was 0.0.  The supine index was 9.5.  The subject experienced 0 minutes of Cheyne-Fierro respiration or 0 % of the recording.  The oximeter recorded 157 desaturations and the Oxygen Desaturation Index AMOS)  was 19.6. The baseline saturation was 96% , the average saturation was 91% , and the shashank saturation was 76%.   The patient spent 42 % of the recording with saturations less than or equal to 90%.  The patient spent 25 minutes with saturations less than or equal to 88%.  The minimum heart rate was 55 bpm, the average heart rate was 73 bpm, and the  maximum heart rate was 163 bpm.  The patient slept mostly in the supine position.  Significant snoring was recorded.  The patient experienced 4216 breaths and 441 snores.      Interpretation:     Mild OSAH - ALINE 7  Mild to moderate and persistent nocturnal desaturation - shashank saturation 76% - saturations <=88%Time 25 minutes      Recommendation:    If significant signs, symptoms, and or comorbidities warrant, recommend a positive airway pressure titration. Alternative treatments for OSAH include behavioral modification, use of a dental appliance, and nasopharyngeal reconstructive surgery. Behavior modification includes weight loss, eliminating alcohol and sedatives, and avoiding the supine position. If alternative treatments are used, a follow-up diagnostic study is warranted to ensure efficacy.            
Insulin pump/Automatic Implantable Cardioverter Defibrillator/Pacemaker/AICD

## 2018-08-09 NOTE — PROGRESS NOTES
CC: Follow up for HST results    HPI:    Mr. Darrell Latham is a 47 y/o M who was [facility] 6/1/[facility]  The patient is a 47-year-old man who has had sleep issues for many years.  He is always been troubled by some degree of insomnia.  Sleep initiation is usually okay however, if he wakes up during the night he has difficulty getting back to sleep.  This is been going on for a number of years and has worsened recently.  At the present time if he is sleeping well he will usually get up at 4 AM.  He likes to workout at the gym early.  He then comes home for breakfast and to take care of the dog.  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  Dinner at 7.  He goes to bed anywhere from 8:30-9:30.  He falls asleep reasonably well but will wake up at midnight and then again wake up about 2:00 am and cannot get back to sleep.  At that time he often is concerned because he knows that he cannot sleep he will have a bad day and be excessively tired.  He is always had somewhat of a difficulty falling back to sleep.  However in the past he worked at home and was not concerned about meeting a schedule.  He had less anxiety surrounding his sleep pattern.     More recently he is feeling tired even if he slept okay.  He has awakened at times with gasping respirations.  He has been told that he snores.  He is not aware of being told about witnessed apneas.  He has never been diagnosed with obstructive sleep apnea.  There is no history to suggest narcolepsy or movement disorder.    The plan for an attended PSG was denied by insurance.    His home study was performed on 7/16:    Summary:     This home sleep study was recorded using an ApneaLink Air Type III device and was performed on 7/16/2018. The recording duration was 8 hours and 15 minutes, the monitoring time for flow duration was 4 hours and 8 minutes, and the oxygen saturation evaluation duration was 8 hours and 1 minute.     The  patient experienced 1 apneas and 28 hypopneas. The Respiratory Event Index (ALINE) was 7.0, the AI was 0.2, the HI was 6.8, and the Central Index was 0.0.  The supine index was 9.5.  The subject experienced 0 minutes of Cheyne-Fierro respiration or 0 % of the recording.  The oximeter recorded 157 desaturations and the Oxygen Desaturation Index AMOS)  was 19.6. The baseline saturation was 96% , the average saturation was 91% , and the shashank saturation was 76%.   The patient spent 42 % of the recording with saturations less than or equal to 90%.  The patient spent 25 minutes with saturations less than or equal to 88%.  The minimum heart rate was 55 bpm, the average heart rate was 73 bpm, and the  maximum heart rate was 163 bpm.  The patient slept mostly in the supine position.  Significant snoring was recorded.  The patient experienced 4216 breaths and 441 snores.       Interpretation:      Mild OSAH - ALINE 7  Mild to moderate and persistent nocturnal desaturation - shashank saturation 76% - saturations <=88%Time 25 minutes     Recommendation:     If significant signs, symptoms, and or comorbidities warrant, recommend a positive airway pressure titration. Alternative treatments for OSAH include behavioral modification, use of a dental appliance, and nasopharyngeal reconstructive surgery. Behavior modification includes weight loss, eliminating alcohol and sedatives, and avoiding the supine position. If alternative treatments are used, a follow-up diagnostic study is warranted to ensure efficacy.    Currently taking Lunesta to help facilitate sleep onset and maintenance. Is also taking low dose xanax.      Patient Active Problem List    Diagnosis Date Noted   • Chronic fatigue 02/26/2018   • Vertigo 02/26/2018   • Hyponatremia 07/19/2016   • Chest pain 07/19/2016       Past Medical History:   Diagnosis Date   • Bowel habit changes     constipation and diarrhea   • Chickenpox    • Heart burn    • HIV (human immunodeficiency  virus infection) (HCC)     Diagnosed in [place], has never been on ART or had AIDS defining illness.   • Low testosterone in male         Past Surgical History:   Procedure Laterality Date   • FERNANDO BY LAPAROSCOPY  2/28/2018    Procedure: FERNANDO BY LAPAROSCOPY;  Surgeon: Bernadine James M.D.;  Location: SURGERY [facility];  Service: General       Family History   Problem Relation Age of Onset   • Alzheimer's Disease Maternal Uncle    • Alzheimer's Disease Paternal Grandmother    • GI Father         CIrrhosis   • Cancer Maternal Aunt         Kidney   • Heart Disease Neg Hx        Social History     Social History   • Marital status: Single     Spouse name: N/A   • Number of children: N/A   • Years of education: N/A     Occupational History   • Not on file.     Social History Main Topics   • Smoking status: Never Smoker   • Smokeless tobacco: Never Used   • Alcohol use 2.4 oz/week     4 Standard drinks or equivalent per week      Comment: OCC.    • Drug use: No   • Sexual activity: Not Currently     Other Topics Concern   • Not on file     Social History Narrative   • No narrative on file       Current Outpatient Prescriptions   Medication Sig Dispense Refill   • Eszopiclone (LUNESTA) 3 MG Tab Take 3 mg by mouth.     • ALPRAZolam (XANAX) 0.25 MG Tab Take 0.25 mg by mouth at bedtime as needed for Sleep.     • PANTOPRAZOLE SODIUM PO Take  by mouth.     • fluticasone (FLONASE) 50 MCG/ACT nasal spray Spray 1 Spray in nose every day.     • Multiple Vitamins-Minerals (AIRBORNE PO) Take  by mouth.     • testosterone cypionate (DEPO-TESTOSTERONE) 200 MG/ML Solution injection 50 mg by Intramuscular route Once.     • Non Formulary Request [facility] GENERIC BRAND FOR FLONASE-PRN     • LORazepam (ATIVAN) 1 MG Tab 1 TABLET AT BEDTIME AS NEEDED ONCE A DAY ORALLY 30 DAYS  0   • ondansetron (ZOFRAN ODT) 4 MG TABLET DISPERSIBLE Take 4 mg by mouth every 8 hours as needed for Nausea.     • therapeutic multivitamin-minerals  "(THERAGRAN-M) Tab Take 1 Tab by mouth every day.       No current facility-administered medications for this visit.     \"CURRENT RX\"    ALLERGIES: Latex and Seasonal    ROS    Constitutional: Denies fever, chills, sweats,  weight loss, fatigue  Cardiovascular: Denies chest pain, tightness, palpitations, swelling in legs/feet, fainting, difficulty breathing when lying down but gets better when sitting up.   Respiratory: Denies shortness of breath, cough, sputum, wheezing, painful breathing, coughing up blood.   Sleep: per HPI  Gastrointestinal: Denies  difficulty swallowing, nausea, abdominal pain, diarrhea, constipation, heartburn..   Musculoskeletal: Denies painful joints, sore muscles, back pain.        PHYSICAL EXAM  Not obese    /72   Pulse 80   Resp 16   Ht 1.753 m (5' 9\")   Wt 75.3 kg (166 lb)   SpO2 97%   BMI 24.51 kg/m²   Appearance: Well-nourished, well-developed, no acute distress  Eyes:  PERRLA, EOMI  ENMT: without lesions, deformities;hearing grossly intact; tongue normal, posterior pharynx without erythema or exudate; Mallampati classification:   Neck: Supple, trachea midline, no masses  Respiratory effort:  No intercostal retractions or use of accessory muscles  Lung auscultation:  No wheezes rhonchi rubs or rales  Cardiac: No murmurs, rubs, or gallops; regular rhythm, normal rate; no edema  Abdomen:  No tenderness, no organomegaly  Musculoskeletal:  Grossly normal; gait and station normal; digits and nails normal  Skin:  No rashes, petechiae, cyanosis  Neurologic: without focal signs; oriented to person, time, place, and purpose; judgement intact  Psychiatric:  No depression, anxiety, agitation        PROBLEMS:  1. BUTCH (obstructive sleep apnea)    - DME CPAP    2. Primary insomnia      3. Chronic fatigue      4.  Nocturnal hypoxemia        PLAN:   Given the patient's symptoms of insomnia, his symptoms of sleep apnea, and is a significant nocturnal hypoxemia, treatment with positive airway " pressure is in order.  We will challenge him with auto titrating CPAP 5-15 cm H2O with clinical and machine download follow-up in approximately 2-3 months.  He may also benefit from an overnight oximetry on Pap once we know the sleep apnea is adequately controlled on auto titrating CPAP.  Should the auto titrating CPAP prove ineffective, and attended Pap titration may be in order.    If insomnia persists after BUTCH is adequately treated, would refer to Dr. Alves.    RTC 2-3 months.

## 2019-01-03 ENCOUNTER — APPOINTMENT (OUTPATIENT)
Dept: SLEEP MEDICINE | Facility: MEDICAL CENTER | Age: 49
End: 2019-01-03
Payer: COMMERCIAL

## 2019-02-28 ENCOUNTER — SLEEP CENTER VISIT (OUTPATIENT)
Dept: SLEEP MEDICINE | Facility: MEDICAL CENTER | Age: 49
End: 2019-02-28
Payer: COMMERCIAL

## 2019-02-28 VITALS
RESPIRATION RATE: 15 BRPM | SYSTOLIC BLOOD PRESSURE: 110 MMHG | HEART RATE: 64 BPM | HEIGHT: 69 IN | OXYGEN SATURATION: 95 % | WEIGHT: 166 LBS | DIASTOLIC BLOOD PRESSURE: 70 MMHG | BODY MASS INDEX: 24.59 KG/M2

## 2019-02-28 DIAGNOSIS — G47.33 OSA (OBSTRUCTIVE SLEEP APNEA): ICD-10-CM

## 2019-02-28 PROCEDURE — 99213 OFFICE O/P EST LOW 20 MIN: CPT | Performed by: FAMILY MEDICINE

## 2019-02-28 NOTE — PROGRESS NOTES
[facility] Follow Up Note     Date: 2/28/2019 / Time: 3:50 PM    Patient ID:   Name:             Darrell Latham   YOB: 1970  Age:                 48 y.o.  male   MRN:               [id number]      Thank you for requesting a sleep medicine consultation on Darrell Latham at the sleep center. He presents today with the chief complaints of BUTCH follow up.     HISTORY OF PRESENT ILLNESS:       Pt is currently on ACPAP 5-15 cm. He goes to sleep around 9 pm and wakes up around 5 am. He is getting about 6 hrs of sleep on a good night and about 4-5 hr of sleep on a bad night. The bad nights are about few per week. He uses xanax 1 mg on every night. He is using CPAP most days of the week. Pt reports 8 hrs of average nightly use of CPAP. Pt denies snoring, gasping,choking.Pt also denies significant mask leak that is interfering with sleep.      The 30 day compliance was downloaded which shows adequate compliance with more that 4 hr usage about 80%. The AHI is has improved to 1.2/hr. The mask leak is normal. The symptoms of excessive daytime, snoring and gasping has improved.         REVIEW OF SYSTEMS:       Constitutional: Denies fevers, Denies weight changes  Eyes: Denies changes in vision, no eye pain  Ears/Nose/Throat/Mouth: Denies nasal congestion or sore throat   Cardiovascular: Denies chest pain or palpitations   Respiratory: Denies shortness of breath , Denies cough  Gastrointestinal/Hepatic: Denies abdominal pain, nausea, vomiting, diarrhea, constipation or GI bleeding   Genitourinary: Deniesdysuria or frequency  Musculoskeletal/Rheum: Denies  joint pain and swelling   Skin/Breast: Denies rash,   Neurological: Denies headache, confusion, memory loss or focal weakness/parasthesias  Psychiatric: denies mood disorder   Sleep: denies snoring and apneas     Comprehensive review of systems form is reviewed with the patient and is attached in the EMR.     PMH:  has a past medical  history of Bowel habit changes; Chickenpox; Heart burn; HIV (human immunodeficiency virus infection) (HCC); and Low testosterone in male. He also has no past medical history of Diabetes (HCC); Hyperlipidemia; or Hypertension.  MEDS:   Current Outpatient Prescriptions:   •  ALPRAZolam (XANAX) 0.25 MG Tab, Take 1 mg by mouth at bedtime as needed for Sleep., Disp: , Rfl:   •  PANTOPRAZOLE SODIUM PO, Take  by mouth., Disp: , Rfl:   •  Multiple Vitamins-Minerals (AIRBORNE PO), Take  by mouth., Disp: , Rfl:   •  testosterone cypionate (DEPO-TESTOSTERONE) 200 MG/ML Solution injection, 50 mg by Intramuscular route Once., Disp: , Rfl:   •  therapeutic multivitamin-minerals (THERAGRAN-M) Tab, Take 1 Tab by mouth every day., Disp: , Rfl:   •  Eszopiclone (LUNESTA) 3 MG Tab, Take 3 mg by mouth., Disp: , Rfl:   •  fluticasone (FLONASE) 50 MCG/ACT nasal spray, Spray 1 Spray in nose every day., Disp: , Rfl:   •  Non Formulary Request, [facility] GENERIC BRAND FOR FLONASE-PRN, Disp: , Rfl:   •  LORazepam (ATIVAN) 1 MG Tab, 1 TABLET AT BEDTIME AS NEEDED ONCE A DAY ORALLY 30 DAYS, Disp: , Rfl: 0  •  ondansetron (ZOFRAN ODT) 4 MG TABLET DISPERSIBLE, Take 4 mg by mouth every 8 hours as needed for Nausea., Disp: , Rfl:   ALLERGIES:   Allergies   Allergen Reactions   • Latex      rash   • Seasonal Itching     Sinus pressure, itchy eyes, itchy throat     SURGHX:   Past Surgical History:   Procedure Laterality Date   • FERNANDO BY LAPAROSCOPY  2/28/2018    Procedure: FERNANDO BY LAPAROSCOPY;  Surgeon: Bernadine James M.D.;  Location: SURGERY [facility];  Service: General     SOCHX:  reports that he has never smoked. He has never used smokeless tobacco. He reports that he drinks about 2.4 oz of alcohol per week . He reports that he does not use drugs..  FH:   Family History   Problem Relation Age of Onset   • Alzheimer's Disease Maternal Uncle    • Alzheimer's Disease Paternal Grandmother    • GI Father         CIrrhosis   • Cancer Maternal Aunt  "        Kidney   • Heart Disease Neg Hx          Physical Exam:  Vitals/ General Appearance:   Weight/BMI: Body mass index is 24.51 kg/m².  Blood pressure 110/70, pulse 64, resp. rate 15, height 1.753 m (5' 9\"), weight 75.3 kg (166 lb), SpO2 95 %.  Vitals:    02/28/19 1545   BP: 110/70   BP Location: Right arm   Patient Position: Sitting   BP Cuff Size: Adult   Pulse: 64   Resp: 15   SpO2: 95%   Weight: 75.3 kg (166 lb)   Height: 1.753 m (5' 9\")       Pt. is alert and oriented to time, place and person. Cooperative and in no apparent distress.       1. Head: Atraumatic, normocephalic.   2. Ears: Normal tympanic membrane and no discharge  3. Nose: No inferior turbinate hypertophy, no septal deviation, no polyp.   4. Throat: Oropharynx appears crowded in that the palate is overhanging .   5. Neck: Supple. No thyromegaly  6. Chest: Trachea central, no spine deformity   7. Lungs auscultation: B/L good air entry, vesicular breath sounds, no adventitious sounds  8. Heart auscultation: 1st and 2nd heart sounds normal, regular rhythm. No appreciable murmur.  9 Extremities: no clubbing, no pedal edema.  10. Skin: No rash        INVESTIGATIONS:           ASSESSMENT AND PLAN     1. Sleep Apnea (BUTCH).    The pathophysiology of sleep anea and the increased risk of cardiovascular morbidity from untreated sleep apnea is discussed in detail with the patient.     He is urged to avoid supine sleep, weight gain and alcoholic beverages since all of these can worsen sleep apnea. He is cautioned against drowsy driving. If He feels sleepy while driving, He must pull over for a break/nap, rather than persist on the road, in the interest of He own safety and that of others on the road.      Plan   - Continue 5-15  cm with   - compliance download was reviewed and discussed with the pt   - compliance was reinforced     2. The evolution of  insomnia is discussed in detail. The importance of cognitive restructuring and behavior modification " therapy is emphasized for long-term improvement rather than the use of hypnotic agents, which may offer short term relief but may lead to the development of tolerance and side effects with prolonged use. Evening exercise, wind-down before bedtime, and stimulus control (leaving the bed when unable to fall back asleep at night) are explained.      Plan   - Handouts on stimulus control and sleep hygiene are given and a couple of titles of books on insomnia are recommended, written by behavioral psychologists.    - Recommended to use xanax PRN if he is unable to wean off of xanax, will consider switching to  ambien

## 2019-04-26 NOTE — PROGRESS NOTES
CC: Follow up for BUTCH on AutoPap and insomnia    HPI:  Mr. Darrell Latham is a 48-year-old man who was last seen 2/28/2019 for insomnia and BUTCH on AutoPap.  The patient has been using Lunesta to facilitate sleep onset and maintenance.    His original home sleep test was performed on 7/16/2018 and showed mild obstructive sleep apnea hypopnea with an ALINE of 7/shashank saturation 76%, and an oxygen desaturation index of 19.6.  Last seen in February he is compliance was excellent on auto titrating CPAP 5 to 15 cm H2O reduced AHI of 1.2.  Today he is 30-day compliance is 57% for 5 hours and 20 minutes on days used.  He is in CPAP 5 to 15 cm H2O.  He has a residual apnea hypotony index of 2.3.  His median leak is 5.9 L/min.    The patient reports improved symptoms using positive airway pressure.  Has experienced no significant problems with mask fit, mask leak, pressure tolerance, excessive airway dryness, nasal congestion, or chest pain.      Significant comorbidities and modifying factors include normal weight, non-smoker, and low testosterone.    Patient Active Problem List    Diagnosis Date Noted   • BUTCH (obstructive sleep apnea) 02/28/2019   • Chronic fatigue 02/26/2018   • Vertigo 02/26/2018   • Hyponatremia 07/19/2016   • Chest pain 07/19/2016       Past Medical History:   Diagnosis Date   • Bowel habit changes     constipation and diarrhea   • Chickenpox    • Heart burn    • HIV (human immunodeficiency virus infection) (HCC)     Diagnosed in [place], has never been on ART or had AIDS defining illness.   • Low testosterone in male         Past Surgical History:   Procedure Laterality Date   • FERNANDO BY LAPAROSCOPY  2/28/2018    Procedure: FERNANDO BY LAPAROSCOPY;  Surgeon: Bernadine James M.D.;  Location: SURGERY [facility];  Service: General       Family History   Problem Relation Age of Onset   • Alzheimer's Disease Maternal Uncle    • Alzheimer's Disease Paternal Grandmother    • GI Father          "CIrrhosis   • Cancer Maternal Aunt         Kidney   • Heart Disease Neg Hx        Social History     Social History   • Marital status: Single     Spouse name: N/A   • Number of children: N/A   • Years of education: N/A     Occupational History   • Not on file.     Social History Main Topics   • Smoking status: Never Smoker   • Smokeless tobacco: Never Used   • Alcohol use 2.4 oz/week     4 Standard drinks or equivalent per week      Comment: OCC.    • Drug use: No   • Sexual activity: Not Currently     Other Topics Concern   • Not on file     Social History Narrative   • No narrative on file       Current Outpatient Prescriptions   Medication Sig Dispense Refill   • Probiotic Product (PROBIOTIC PO) Take  by mouth.     • MAGNESIUM PO Take  by mouth.     • Eszopiclone (LUNESTA) 3 MG Tab Take 3 mg by mouth.     • PANTOPRAZOLE SODIUM PO Take  by mouth.     • fluticasone (FLONASE) 50 MCG/ACT nasal spray Spray 1 Spray in nose every day.     • Multiple Vitamins-Minerals (AIRBORNE PO) Take  by mouth.     • testosterone cypionate (DEPO-TESTOSTERONE) 200 MG/ML Solution injection 50 mg by Intramuscular route Once.     • ALPRAZolam (XANAX) 0.25 MG Tab Take 1 mg by mouth at bedtime as needed for Sleep.     • Non Formulary Request [employer] GENERIC BRAND FOR FLONASE-PRN     • LORazepam (ATIVAN) 1 MG Tab 1 TABLET AT BEDTIME AS NEEDED ONCE A DAY ORALLY 30 DAYS  0   • ondansetron (ZOFRAN ODT) 4 MG TABLET DISPERSIBLE Take 4 mg by mouth every 8 hours as needed for Nausea.     • therapeutic multivitamin-minerals (THERAGRAN-M) Tab Take 1 Tab by mouth every day.       No current facility-administered medications for this visit.     \"CURRENT RX\"    ALLERGIES: Latex and Seasonal    ROS    Constitutional: Denies fever, chills, sweats,  weight loss, fatigue  Cardiovascular: Denies chest pain, tightness, palpitations, swelling in legs/feet, fainting, difficulty breathing when lying down but gets better when sitting up.   Respiratory: Denies " "shortness of breath, cough, sputum, wheezing, painful breathing, coughing up blood.   Sleep: per HPI  Gastrointestinal: Denies  difficulty swallowing, nausea, abdominal pain, diarrhea, constipation, heartburn.  Musculoskeletal: Denies painful joints, sore muscles, back pain.        PHYSICAL EXAM  Normal weight    /82 (BP Location: Right arm, Patient Position: Sitting, BP Cuff Size: Adult)   Pulse 75   Resp 15   Ht 1.753 m (5' 9\")   Wt 73 kg (161 lb)   SpO2 95%   BMI 23.78 kg/m²   Appearance: Well-nourished, well-developed, no acute distress  Eyes:  PERRLA, EOMI; glasses  ENMT: without lesions, deformities;hearing grossly intact; tongue normal, posterior pharynx without erythema or exudate; Mallampati classification:   Neck: Supple, trachea midline, no masses  Respiratory effort:  No intercostal retractions or use of accessory muscles  Lung auscultation:  No wheezes rhonchi rubs or rales  Cardiac: No murmurs, rubs, or gallops; regular rhythm, normal rate; no edema  Abdomen:  No tenderness, no organomegaly  Musculoskeletal:  Grossly normal; gait and station normal; digits and nails normal  Skin:  No rashes, petechiae, cyanosis  Neurologic: without focal signs; oriented to person, time, place, and purpose; judgement intact  Psychiatric:  No depression, anxiety, agitation        PROBLEMS:  1. BUTCH (obstructive sleep apnea)      2. Non-smoker      3. Body mass index is 23.78 kg/m².        PLAN:     Return in about 6 months (around 10/29/2019).    Has been advised to continue the current AutoPap 5 to 15 cm H2O, clean equipment frequently, and get new mask and supplies as allowed by insurance and DME. Advised about potential problems including nasal obstruction/stuffiness, mask leak or discomfort , frequent awakenings, chill or dryness of the upper airway, noise, inconvenience, and lack of benefit. Recommend an earlier appointment, if significant treatment barriers develop.    He has been encouraged to use his " machine as much as possible.    Have advised the patient to follow up with the appropriate healthcare practitioners for all other medical problems and issues.    Considering switching from a FFM.  He chose a Respironics Nuance Pro mask large size.    Will refer to Dr. Alves to [facility].

## 2019-04-29 ENCOUNTER — SLEEP CENTER VISIT (OUTPATIENT)
Dept: SLEEP MEDICINE | Facility: MEDICAL CENTER | Age: 49
End: 2019-04-29
Payer: COMMERCIAL

## 2019-04-29 VITALS
WEIGHT: 161 LBS | DIASTOLIC BLOOD PRESSURE: 82 MMHG | HEIGHT: 69 IN | SYSTOLIC BLOOD PRESSURE: 120 MMHG | RESPIRATION RATE: 15 BRPM | BODY MASS INDEX: 23.85 KG/M2 | OXYGEN SATURATION: 95 % | HEART RATE: 75 BPM

## 2019-04-29 DIAGNOSIS — Z78.9 NON-SMOKER: ICD-10-CM

## 2019-04-29 DIAGNOSIS — G47.00 INSOMNIA, UNSPECIFIED TYPE: ICD-10-CM

## 2019-04-29 DIAGNOSIS — G47.33 OSA (OBSTRUCTIVE SLEEP APNEA): ICD-10-CM

## 2019-04-29 PROCEDURE — 99214 OFFICE O/P EST MOD 30 MIN: CPT | Performed by: INTERNAL MEDICINE

## 2019-05-14 NOTE — OR NURSING
0838 Received pt from Or, grimacing, moaning, pulling at lines, unable to answer questions. Pt beaths clear and unlabored.  Multiple staff at bedside for pt safety, pillows provided.  Unable to obtain initial BP due to pt's restlessness. Anesthesia at bedside.  Medicated for assumed pain.  Ice pack applied.  HOB 30 degrees [facility] sites CDI.  0850 Pt awake and alert able to communicate pain 9/10 tolerating sips of liquids.  No c/o nausea.  0910 Pt's partner updated via phone.  Left   0915 No changes.  0945 Pt meets criteria for Phase 2.  Gave report to Nelly RN  0950 Pt transported to Phase 2 [facility] sites remain CDI.     13-May-2019 23:25

## 2019-05-16 ENCOUNTER — OFFICE VISIT (OUTPATIENT)
Dept: URGENT CARE | Facility: MEDICAL CENTER | Age: 49
End: 2019-05-16
Payer: COMMERCIAL

## 2019-05-16 VITALS
DIASTOLIC BLOOD PRESSURE: 80 MMHG | HEART RATE: 74 BPM | BODY MASS INDEX: 24.14 KG/M2 | HEIGHT: 69 IN | TEMPERATURE: 97.5 F | WEIGHT: 163 LBS | OXYGEN SATURATION: 98 % | SYSTOLIC BLOOD PRESSURE: 110 MMHG

## 2019-05-16 DIAGNOSIS — J01.00 ACUTE NON-RECURRENT MAXILLARY SINUSITIS: ICD-10-CM

## 2019-05-16 DIAGNOSIS — H69.93 DYSFUNCTION OF BOTH EUSTACHIAN TUBES: ICD-10-CM

## 2019-05-16 PROCEDURE — 99214 OFFICE O/P EST MOD 30 MIN: CPT | Performed by: PHYSICIAN ASSISTANT

## 2019-05-16 RX ORDER — AMOXICILLIN AND CLAVULANATE POTASSIUM 875; 125 MG/1; MG/1
1 TABLET, FILM COATED ORAL 2 TIMES DAILY
Qty: 14 TAB | Refills: 0 | Status: SHIPPED | OUTPATIENT
Start: 2019-05-16 | End: 2019-05-23

## 2019-05-16 RX ORDER — MECLIZINE HYDROCHLORIDE 25 MG/1
25 TABLET ORAL 3 TIMES DAILY PRN
Qty: 30 TAB | Refills: 0 | Status: SHIPPED | OUTPATIENT
Start: 2019-05-16 | End: 2019-05-29

## 2019-05-16 ASSESSMENT — ENCOUNTER SYMPTOMS
SORE THROAT: 0
SINUS PAIN: 1
HEADACHES: 1
FEVER: 0
COUGH: 0
CHILLS: 0
DIZZINESS: 1
SHORTNESS OF BREATH: 0

## 2019-05-16 NOTE — PROGRESS NOTES
Subjective:   Darrell Latham is a 48 y.o. male who presents today with   Chief Complaint   Patient presents with   • Otalgia       Otalgia    There is pain in both ears. This is a new problem. The current episode started in the past 7 days. The problem occurs constantly. The problem has been gradually worsening. There has been no fever. The pain is moderate. Associated symptoms include headaches. Pertinent negatives include no coughing, ear discharge or sore throat. Associated symptoms comments: Sinus pressure. Treatments tried: Allergy medication. The treatment provided no relief.   Patient states he started experiencing allergy-like symptoms about a week ago.  It has been progressively getting worse moving from his ears into his sinuses.    PMH:  has a past medical history of Bowel habit changes; Chickenpox; Heart burn; HIV (human immunodeficiency virus infection) ([facility]); and Low testosterone in male. He also has no past medical history of Diabetes ([facility]); Hyperlipidemia; or Hypertension.  MEDS:   Current Outpatient Prescriptions:   •  amoxicillin-clavulanate (AUGMENTIN) 875-125 MG Tab, Take 1 Tab by mouth 2 times a day for 7 days., Disp: 14 Tab, Rfl: 0  •  meclizine (ANTIVERT) 25 MG Tab, Take 1 Tab by mouth 3 times a day as needed for Dizziness., Disp: 30 Tab, Rfl: 0  •  ALPRAZolam (XANAX) 0.25 MG Tab, Take 1 mg by mouth at bedtime as needed for Sleep., Disp: , Rfl:   •  PANTOPRAZOLE SODIUM PO, Take  by mouth., Disp: , Rfl:   •  testosterone cypionate (DEPO-TESTOSTERONE) 200 MG/ML Solution injection, 50 mg by Intramuscular route Once., Disp: , Rfl:   •  Probiotic Product (PROBIOTIC PO), Take  by mouth., Disp: , Rfl:   •  MAGNESIUM PO, Take  by mouth., Disp: , Rfl:   •  Eszopiclone (LUNESTA) 3 MG Tab, Take 3 mg by mouth., Disp: , Rfl:   •  fluticasone (FLONASE) 50 MCG/ACT nasal spray, Spray 1 Spray in nose every day., Disp: , Rfl:   •  Non Formulary Request, [facility] [employer] [employer] FOR FLONASE-PRN, Disp: , Rfl:  "  •  LORazepam (ATIVAN) 1 MG Tab, 1 TABLET AT BEDTIME AS NEEDED ONCE A DAY ORALLY 30 DAYS, Disp: , Rfl: 0  •  Multiple Vitamins-Minerals (AIRBORNE PO), Take  by mouth., Disp: , Rfl:   •  ondansetron (ZOFRAN ODT) 4 MG TABLET DISPERSIBLE, Take 4 mg by mouth every 8 hours as needed for Nausea., Disp: , Rfl:   •  therapeutic multivitamin-minerals (THERAGRAN-M) Tab, Take 1 Tab by mouth every day., Disp: , Rfl:   ALLERGIES:   Allergies   Allergen Reactions   • Latex      rash   • Seasonal Itching     Sinus pressure, itchy eyes, itchy throat     SURGHX:   Past Surgical History:   Procedure Laterality Date   • FERNANDO BY LAPAROSCOPY  2/28/2018    Procedure: FERNANDO BY LAPAROSCOPY;  Surgeon: Bernadine James M.D.;  Location: SURGERY [facility];  Service: General     SOCHX:  reports that he has never smoked. He has never used smokeless tobacco. He reports that he drinks about 2.4 oz of alcohol per week . He reports that he does not use drugs.  FH: Reviewed with patient, not pertinent to this visit.       Review of Systems   Constitutional: Negative for chills and fever.   HENT: Positive for congestion, ear pain and sinus pain. Negative for ear discharge and sore throat.    Respiratory: Negative for cough and shortness of breath.    Neurological: Positive for dizziness and headaches.   All other systems reviewed and are negative.       Objective:   /80 (BP Location: Left arm, Patient Position: Sitting)   Pulse 74   Temp 36.4 °C (97.5 °F) (Temporal)   Ht 1.753 m (5' 9\")   Wt 73.9 kg (163 lb)   SpO2 98%   BMI 24.07 kg/m²   Physical Exam   Constitutional: Vital signs are normal. He appears well-developed and well-nourished. No distress.   HENT:   Head: Normocephalic and atraumatic.   Right Ear: Hearing normal. A middle ear effusion is present.   Left Ear: Hearing normal. A middle ear effusion is present.   Tenderness to palpation of maxillary sinuses   Eyes: Pupils are equal, round, and reactive to light. "   Cardiovascular: Normal rate, regular rhythm and normal heart sounds.    Pulmonary/Chest: Effort normal.   Musculoskeletal:   Normal movement in all 4 extremities   Neurological: He is alert. No cranial nerve deficit or sensory deficit. Coordination normal.   Skin: Skin is warm and dry.   Psychiatric: He has a normal mood and affect.   Nursing note and vitals reviewed.        Assessment/Plan:   Assessment    1. Acute non-recurrent maxillary sinusitis  - amoxicillin-clavulanate (AUGMENTIN) 875-125 MG Tab; Take 1 Tab by mouth 2 times a day for 7 days.  Dispense: 14 Tab; Refill: 0    2. Dysfunction of both eustachian tubes  - meclizine (ANTIVERT) 25 MG Tab; Take 1 Tab by mouth 3 times a day as needed for Dizziness.  Dispense: 30 Tab; Refill: 0  Discussed with patient his dizziness is likely secondary to allergies and congestion in his ears without appropriate drainage.  He will continue using over-the-counter remedies such as decongestant and Flonase.  Differential diagnosis, natural history, supportive care, and indications for immediate follow-up discussed.   Patient given instructions and understanding of medications and treatment.    If not improving in 3-5 days, F/U with PCP or return to  if symptoms worsen.    Patient agreeable to plan.      Jerrell Fernandez PA-C

## 2019-05-29 NOTE — NON-PROVIDER
PAT note: PAT call made 05/29/2019 at 1330. Spoke with Darrell. Health history, allergies, medications and pre-procedure instructions reviewed with patient.Pt verbalized understanding of instructions.

## 2019-05-30 ENCOUNTER — HOSPITAL ENCOUNTER (OUTPATIENT)
Dept: PAIN MANAGEMENT | Facility: REHABILITATION | Age: 49
End: 2019-05-30
Attending: PHYSICAL MEDICINE & REHABILITATION
Payer: COMMERCIAL

## 2019-05-30 ENCOUNTER — HOSPITAL ENCOUNTER (OUTPATIENT)
Dept: RADIOLOGY | Facility: REHABILITATION | Age: 49
End: 2019-05-30
Attending: PHYSICAL MEDICINE & REHABILITATION

## 2019-05-30 VITALS
SYSTOLIC BLOOD PRESSURE: 148 MMHG | BODY MASS INDEX: 23.8 KG/M2 | RESPIRATION RATE: 16 BRPM | HEIGHT: 69 IN | OXYGEN SATURATION: 98 % | TEMPERATURE: 98.4 F | WEIGHT: 160.72 LBS | DIASTOLIC BLOOD PRESSURE: 86 MMHG | HEART RATE: 72 BPM

## 2019-05-30 PROCEDURE — 64491 INJ PARAVERT F JNT C/T 2 LEV: CPT

## 2019-05-30 PROCEDURE — 700117 HCHG RX CONTRAST REV CODE 255

## 2019-05-30 PROCEDURE — 64490 INJ PARAVERT F JNT C/T 1 LEV: CPT

## 2019-05-30 PROCEDURE — 64492 INJ PARAVERT F JNT C/T 3 LEV: CPT

## 2019-05-30 PROCEDURE — 700111 HCHG RX REV CODE 636 W/ 250 OVERRIDE (IP)

## 2019-05-30 RX ORDER — BUPIVACAINE HYDROCHLORIDE 2.5 MG/ML
INJECTION, SOLUTION EPIDURAL; INFILTRATION; INTRACAUDAL
Status: COMPLETED
Start: 2019-05-30 | End: 2019-05-30

## 2019-05-30 RX ORDER — MIDAZOLAM HYDROCHLORIDE 1 MG/ML
INJECTION INTRAMUSCULAR; INTRAVENOUS
Status: COMPLETED
Start: 2019-05-30 | End: 2019-05-30

## 2019-05-30 RX ORDER — METHYLPREDNISOLONE ACETATE 80 MG/ML
INJECTION, SUSPENSION INTRA-ARTICULAR; INTRALESIONAL; INTRAMUSCULAR; SOFT TISSUE
Status: COMPLETED
Start: 2019-05-30 | End: 2019-05-30

## 2019-05-30 RX ADMIN — BUPIVACAINE HYDROCHLORIDE 5 ML: 2.5 INJECTION, SOLUTION EPIDURAL; INFILTRATION; INTRACAUDAL; PERINEURAL at 13:12

## 2019-05-30 RX ADMIN — IOHEXOL 2 ML: 240 INJECTION, SOLUTION INTRATHECAL; INTRAVASCULAR; INTRAVENOUS; ORAL at 13:11

## 2019-05-30 RX ADMIN — METHYLPREDNISOLONE ACETATE 80 MG: 80 INJECTION, SUSPENSION INTRA-ARTICULAR; INTRALESIONAL; INTRAMUSCULAR; SOFT TISSUE at 13:12

## 2019-05-30 NOTE — NON-PROVIDER
Patient positioned pre-procedure by RN,CST,xray tech. Pillow placed under lower legs and feet for support.

## 2019-05-30 NOTE — PROCEDURES
Preoperative diagnosis:   cervical facetogenic pain/spondylosis C4-5 and C5-6    Postoperative diagnosis: Cervical facetogenic pain/spondylosis C4-5 and C5-6    Procedure performed: Cervical facet blocks bilat C4-5 and C5-6    Procedure patient was escorted to the procedure room they were placed  in a prone position. After giving full consent the area was then marked under fluoroscopic guidance for each facet.  That patient was monitored throughout the procedure with O2 sats vital signs and clinically. The area was then cleansed and prepped in a normal sterile fashion. The areas were then anesthetized at the marks spots after negative aspiration with half cc 1% lidocaine at each spot. Using the spinal needles they were introduced at each spot down to the appropriate facet under fluoroscopic guidance. Utilizing Omnipaque after negative aspiration a quarter cc was then injected and good visualization of the facets were noted on fluoroscopy. The injectate of half cc of 1% lidocaine as well as 40 mg of Depo-Medrol was then injected after negative aspiration. The patient tolerated the procedure and noted the usual pain that they feel in that area. The needle was then retracted the paraspinal muscles utilizing a half cc of 1% lidocaine after negative aspiration the needle was then cleansed and removed completely the areas were then cleansed and a Band-Aid placed the patient was escorted into the postoperative room. They were monitored and hemodynamically stable and given postoperative instructions as well as conscious sedation instructions and  a followup appointment.    Drew Carter MD

## 2019-05-30 NOTE — NON-PROVIDER
Reviewed Plan of Care with patient. Confirmed that he has stopped all blood thinning medications for last 4 days.  Confirmed that he will be using [employer] for transportation home today. Agreed NOT to have sedation has a . Reviewed home care instructions with patient prior to procedure. All questions and concerns addressed.

## 2019-08-27 ENCOUNTER — OFFICE VISIT (OUTPATIENT)
Dept: URGENT CARE | Facility: CLINIC | Age: 49
End: 2019-08-27
Payer: COMMERCIAL

## 2019-08-27 VITALS
SYSTOLIC BLOOD PRESSURE: 122 MMHG | WEIGHT: 167 LBS | RESPIRATION RATE: 16 BRPM | DIASTOLIC BLOOD PRESSURE: 80 MMHG | TEMPERATURE: 99 F | HEART RATE: 65 BPM | OXYGEN SATURATION: 96 % | BODY MASS INDEX: 24.66 KG/M2

## 2019-08-27 DIAGNOSIS — H92.03 EAR PAIN, BILATERAL: ICD-10-CM

## 2019-08-27 PROCEDURE — 99214 OFFICE O/P EST MOD 30 MIN: CPT | Performed by: FAMILY MEDICINE

## 2019-08-27 RX ORDER — CELECOXIB 200 MG/1
200 CAPSULE ORAL DAILY
Qty: 20 CAP | Refills: 0 | Status: SHIPPED | OUTPATIENT
Start: 2019-08-27 | End: 2022-02-14

## 2019-08-27 RX ORDER — AMOXICILLIN AND CLAVULANATE POTASSIUM 875; 125 MG/1; MG/1
1 TABLET, FILM COATED ORAL 2 TIMES DAILY
Qty: 14 TAB | Refills: 0 | Status: SHIPPED | OUTPATIENT
Start: 2019-08-27 | End: 2019-09-03

## 2019-08-27 NOTE — PROGRESS NOTES
Subjective:      Darrell Latham is a 49 y.o. male who presents with Otalgia (bilateral x3 days )      - This is a pleasant and non toxic appearing 49 y.o. male with c/o b/l ear pain x 3 days. No trauma or NVFC. Going to [facility], worried about needing an abx as back up             ALLERGIES:  Latex and Seasonal     PMH:  Past Medical History:   Diagnosis Date   • Bowel habit changes     constipation and diarrhea   • Chickenpox    • Heart burn    • HIV (human immunodeficiency virus infection) (HCC)     Diagnosed in [place], has never been on ART or had AIDS defining illness.   • Low testosterone in male         PSH:  Past Surgical History:   Procedure Laterality Date   • FERNANDO BY LAPAROSCOPY  2/28/2018    Procedure: FERNANDO BY LAPAROSCOPY;  Surgeon: Bernadine James M.D.;  Location: SURGERY [facility];  Service: General       MEDS:    Current Outpatient Medications:   •  celecoxib (CELEBREX) 200 MG Cap, Take 1 Cap by mouth every day., Disp: 20 Cap, Rfl: 0  •  amoxicillin-clavulanate (AUGMENTIN) 875-125 MG Tab, Take 1 Tab by mouth 2 times a day for 7 days., Disp: 14 Tab, Rfl: 0  •  ALPRAZolam (XANAX) 0.25 MG Tab, Take 1 mg by mouth at bedtime as needed for Sleep., Disp: , Rfl:   •  PANTOPRAZOLE SODIUM PO, Take  by mouth., Disp: , Rfl:   •  testosterone cypionate (DEPO-TESTOSTERONE) 200 MG/ML Solution injection, 50 mg by Intramuscular route Once., Disp: , Rfl:   •  Eszopiclone (LUNESTA) 3 MG Tab, Take 3 mg by mouth., Disp: , Rfl:     ** I have documented what I find to be significant in regards to past medical, social, family and surgical history  in my HPI or under PMH/PSH/FH review section, otherwise it is contributory **         HPI    Review of Systems   All other systems reviewed and are negative.         Objective:     /80   Pulse 65   Temp 37.2 °C (99 °F) (Temporal)   Resp 16   Wt 75.8 kg (167 lb)   SpO2 96%   BMI 24.66 kg/m²      Physical Exam   Constitutional: He appears  well-developed and well-nourished. No distress.   HENT:   Head: Normocephalic and atraumatic.   Eyes: Conjunctivae are normal.   Cardiovascular: Normal heart sounds.   No murmur heard.  Pulmonary/Chest: Effort normal and breath sounds normal. No respiratory distress.   Neurological: He is alert. He exhibits normal muscle tone.   Skin: Skin is warm and dry. He is not diaphoretic.   Psychiatric: He has a normal mood and affect. Judgment normal.   Nursing note and vitals reviewed.  ears: seem ok, TMJ w/ some TTP            Assessment/Plan:         1. Ear pain, bilateral  celecoxib (CELEBREX) 200 MG Cap    amoxicillin-clavulanate (AUGMENTIN) 875-125 MG Tab     * seems like TMJ pain       - rest/hydrate       Dx & d/c instructions discussed w/ patient and/or family members.     Follow up with PCP (or here if PCP unavailable) in 2-3 days if symptoms not improving, ER if feeling/getting worse.    Any realistic and/or common medication side effects that may have been given today(i.e. Rash, GI upset/constipation, sedation, elevation of BP or blood sugars) reviewed.     Patient left in stable condition

## 2019-09-30 ENCOUNTER — OFFICE VISIT (OUTPATIENT)
Dept: CARDIOLOGY | Facility: MEDICAL CENTER | Age: 49
End: 2019-09-30
Payer: COMMERCIAL

## 2019-09-30 VITALS
WEIGHT: 169 LBS | DIASTOLIC BLOOD PRESSURE: 84 MMHG | HEIGHT: 69 IN | HEART RATE: 74 BPM | BODY MASS INDEX: 25.03 KG/M2 | SYSTOLIC BLOOD PRESSURE: 122 MMHG | OXYGEN SATURATION: 94 %

## 2019-09-30 DIAGNOSIS — R07.1 CHEST PAIN ON BREATHING: ICD-10-CM

## 2019-09-30 PROCEDURE — 99213 OFFICE O/P EST LOW 20 MIN: CPT | Performed by: INTERNAL MEDICINE

## 2019-09-30 RX ORDER — ALPRAZOLAM 1 MG/1
TABLET ORAL
Refills: 2 | COMMUNITY
Start: 2019-09-10

## 2019-09-30 RX ORDER — MIRTAZAPINE 15 MG/1
TABLET, FILM COATED ORAL
Refills: 0 | COMMUNITY
Start: 2019-08-07 | End: 2022-02-14

## 2019-09-30 RX ORDER — PANTOPRAZOLE SODIUM 40 MG/1
TABLET, DELAYED RELEASE ORAL
Refills: 2 | COMMUNITY
Start: 2019-09-08 | End: 2022-02-14

## 2019-09-30 RX ORDER — RISPERIDONE 0.25 MG/1
TABLET ORAL
Refills: 3 | COMMUNITY
Start: 2019-07-31 | End: 2022-02-14

## 2019-09-30 RX ORDER — RANITIDINE 300 MG/1
300 TABLET ORAL
Refills: 12 | COMMUNITY
Start: 2019-09-09 | End: 2022-02-14

## 2019-09-30 ASSESSMENT — ENCOUNTER SYMPTOMS
INSOMNIA: 1
GASTROINTESTINAL NEGATIVE: 1
MUSCULOSKELETAL NEGATIVE: 1
RESPIRATORY NEGATIVE: 1
CONSTITUTIONAL NEGATIVE: 1
NEUROLOGICAL NEGATIVE: 1

## 2019-09-30 NOTE — PROGRESS NOTES
Chief Complaint   Patient presents with   • Chest Pain   • Dizziness   • Fatigue       Subjective:   Darrell Latham is a 49 y.o. male returns today for evaluation of chest pain.  For the last 2 to 3 months he has had episodes of left anterior chest pain that is not related.  The discomfort can last for hours and actually feels better with exercise.  Over the weekend he ran 2 miles on both Saturday and Sunday and had no symptoms of chest pain or exercise intolerance at all.  He notes that his pain is worse with really deep breathing and also somewhat worse with lying on that side.  There is no history of chest trauma.  A recent CT of his chest performed through [facility] and we will endeavor to obtain those results.    He was seen in our office in May, 2018 for evaluation of dizziness.  A Holter monitor was placed.  He said no episodes of dizziness or syncope since.    Cardiac risk factor profile negative for smoking, hypertension, diabetes, lipid problems or family history of premature coronary disease.    Family history: Father's history is unknown.  Mother is in good health.  Maternal grandfather had a heart attack at 69.    Social history: Non-smoker, previously exercise frequently now exercises rarely.    Past Medical History:   Diagnosis Date   • Bowel habit changes     constipation and diarrhea   • Chickenpox    • Heart burn    • HIV (human immunodeficiency virus infection) (HCC)     Diagnosed in [place], has never been on ART or had AIDS defining illness.   • Low testosterone in male      Past Surgical History:   Procedure Laterality Date   • FERNANDO BY LAPAROSCOPY  2/28/2018    Procedure: FERNANDO BY LAPAROSCOPY;  Surgeon: Bernadine James M.D.;  Location: SURGERY [facility];  Service: General     Family History   Problem Relation Age of Onset   • Alzheimer's Disease Maternal Uncle    • Alzheimer's Disease Paternal Grandmother    • GI Disease Father         CIrrhosis   • Cancer Maternal  Aunt         Kidney   • Heart Disease Neg Hx      Social History     Socioeconomic History   • Marital status: Single     Spouse name: Not on file   • Number of children: Not on file   • Years of education: Not on file   • Highest education level: Not on file   Occupational History   • Not on file   Social Needs   • Financial resource strain: Not on file   • Food insecurity:     Worry: Not on file     Inability: Not on file   • Transportation needs:     Medical: Not on file     Non-medical: Not on file   Tobacco Use   • Smoking status: Never Smoker   • Smokeless tobacco: Never Used   Substance and Sexual Activity   • Alcohol use: Yes     Alcohol/week: 2.4 oz     Types: 4 Standard drinks or equivalent per week     Comment: OCC.    • Drug use: No   • Sexual activity: Not Currently   Lifestyle   • Physical activity:     Days per week: Not on file     Minutes per session: Not on file   • Stress: Not on file   Relationships   • Social connections:     Talks on phone: Not on file     Gets together: Not on file     Attends Anabaptism service: Not on file     Active member of club or organization: Not on file     Attends meetings of clubs or organizations: Not on file     Relationship status: Not on file   • Intimate partner violence:     Fear of current or ex partner: Not on file     Emotionally abused: Not on file     Physically abused: Not on file     Forced sexual activity: Not on file   Other Topics Concern   • Not on file   Social History Narrative   • Not on file     Allergies   Allergen Reactions   • Latex      rash   • Seasonal Itching     Sinus pressure, itchy eyes, itchy throat     Outpatient Encounter Medications as of 9/30/2019   Medication Sig Dispense Refill   • celecoxib (CELEBREX) 200 MG Cap Take 1 Cap by mouth every day. 20 Cap 0   • ALPRAZolam (XANAX) 0.25 MG Tab Take 1 mg by mouth at bedtime as needed for Sleep.     • PANTOPRAZOLE SODIUM PO Take  by mouth every day.     • testosterone cypionate  "(DEPO-TESTOSTERONE) 200 MG/ML Solution injection 50 mg by Intramuscular route Once.     • ALPRAZolam (XANAX) 1 MG Tab TAKE 1 TABLET BY MOUTH EVERY NIGHT AT BEDTIME F51.01, 30 DAYS  2   • mirtazapine (REMERON) 15 MG Tab 1/2-1 TABLET AT BEDTIME ONCE A DAY ORALLY 30 DAY(S)  0   • pantoprazole (PROTONIX) 40 MG Tablet Delayed Response TAKE 1 TABLET BY MOUTH ONCE A DAY 30 MINUTES BEFORE BREAKFAST MEAL  2   • ranitidine (ZANTAC) 300 MG tablet Take 300 mg by mouth every day.  12   • risperidone (RISPERDAL) 0.25 MG Tab TAKE 1 TABLET BY MOUTH EVERYDAY AT BEDTIME  3   • Eszopiclone (LUNESTA) 3 MG Tab Take 3 mg by mouth.       No facility-administered encounter medications on file as of 9/30/2019.      Review of Systems   Constitutional: Negative.    HENT: Negative.    Respiratory: Negative.    Cardiovascular: Positive for chest pain.   Gastrointestinal: Negative.    Musculoskeletal: Negative.    Skin: Negative.    Neurological: Negative.    Endo/Heme/Allergies: Negative.    Psychiatric/Behavioral: The patient has insomnia.         Objective:   /84 (BP Location: Left arm, Patient Position: Sitting, BP Cuff Size: Adult)   Pulse 74   Ht 1.753 m (5' 9\")   Wt 76.7 kg (169 lb)   SpO2 94%   BMI 24.96 kg/m²     Physical Exam   Constitutional: He is oriented to person, place, and time. He appears well-nourished. No distress.   HENT:   Head: Normocephalic and atraumatic.   Eyes: Pupils are equal, round, and reactive to light. No scleral icterus.   Neck: No JVD present. No tracheal deviation present.   Cardiovascular: Normal rate and regular rhythm.   No murmur heard.  Pulmonary/Chest: Breath sounds normal. No respiratory distress. He has no wheezes.   Abdominal: Soft. Bowel sounds are normal. He exhibits no distension. There is no tenderness.   Musculoskeletal: He exhibits no edema.   Neurological: He is alert and oriented to person, place, and time.   Skin: Skin is warm and dry.   Psychiatric: He has a normal mood and " affect.       Assessment:     1. Chest pain on breathing  Treadmill Stress       Medical Decision Making:  Today's Assessment / Status / Plan:   Chest pain: Atypical by history and that the pain is nonexertional, can last for hours, and is somewhat positional.  Physical examination is unremarkable.  Recommend patient have an exercise tolerance test.  He will be notified of the results.

## 2019-11-06 ENCOUNTER — NON-PROVIDER VISIT (OUTPATIENT)
Dept: CARDIOLOGY | Facility: MEDICAL CENTER | Age: 49
End: 2019-11-06
Attending: INTERNAL MEDICINE
Payer: COMMERCIAL

## 2019-11-06 VITALS
HEIGHT: 69 IN | OXYGEN SATURATION: 95 % | BODY MASS INDEX: 25.03 KG/M2 | DIASTOLIC BLOOD PRESSURE: 78 MMHG | SYSTOLIC BLOOD PRESSURE: 112 MMHG | WEIGHT: 169 LBS | HEART RATE: 70 BPM

## 2019-11-06 DIAGNOSIS — R07.1 CHEST PAIN ON BREATHING: ICD-10-CM

## 2019-11-06 LAB — TREADMILL STRESS: NORMAL

## 2019-11-06 PROCEDURE — 93015 CV STRESS TEST SUPVJ I&R: CPT | Performed by: INTERNAL MEDICINE

## 2019-11-08 ENCOUNTER — TELEPHONE (OUTPATIENT)
Dept: CARDIOLOGY | Facility: MEDICAL CENTER | Age: 49
End: 2019-11-08

## 2019-11-08 NOTE — TELEPHONE ENCOUNTER
----- Message from Axel Medina M.D. sent at 11/8/2019 12:41 PM PST -----  Regarding: Treadmill.  Treadmill demonstrates excellent exercise tolerance and is negative for ischemia.  Occasional PVCs were noted near peak exercise which is a 12 expected finding.

## 2020-02-29 ENCOUNTER — OFFICE VISIT (OUTPATIENT)
Dept: URGENT CARE | Facility: CLINIC | Age: 50
End: 2020-02-29
Payer: COMMERCIAL

## 2020-02-29 VITALS
DIASTOLIC BLOOD PRESSURE: 76 MMHG | HEART RATE: 91 BPM | BODY MASS INDEX: 25.92 KG/M2 | OXYGEN SATURATION: 95 % | WEIGHT: 175 LBS | HEIGHT: 69 IN | TEMPERATURE: 98.2 F | RESPIRATION RATE: 16 BRPM | SYSTOLIC BLOOD PRESSURE: 114 MMHG

## 2020-02-29 DIAGNOSIS — R21 RASH: ICD-10-CM

## 2020-02-29 PROCEDURE — 99214 OFFICE O/P EST MOD 30 MIN: CPT | Performed by: PHYSICIAN ASSISTANT

## 2020-02-29 RX ORDER — DOXYCYCLINE HYCLATE 100 MG
100 TABLET ORAL 2 TIMES DAILY
Qty: 14 TAB | Refills: 0 | Status: SHIPPED | OUTPATIENT
Start: 2020-02-29 | End: 2020-03-07

## 2020-02-29 RX ORDER — NYSTATIN 100000 U/G
CREAM TOPICAL
Qty: 45 G | Refills: 1 | Status: SHIPPED | OUTPATIENT
Start: 2020-02-29 | End: 2022-02-14

## 2020-02-29 ASSESSMENT — ENCOUNTER SYMPTOMS
CHILLS: 0
FEVER: 0

## 2020-02-29 NOTE — PROGRESS NOTES
Subjective:   Darrell Latham is a 49 y.o. male who presents today with   Chief Complaint   Patient presents with   • Rash     two spots on rt leg        Rash   This is a new problem. The current episode started 1 to 4 weeks ago. The problem is unchanged. The affected locations include the right lower leg. The rash is characterized by redness and itchiness. He was exposed to nothing. Pertinent negatives include no fever. Treatments tried: Lotrimin. The treatment provided no relief.     Patient states he has noticed a rash that consisted of 2 spots on his right lower leg that has been waxing and waning over the past couple of weeks.  PMH:  has a past medical history of Bowel habit changes, Chickenpox, Heart burn, HIV (human immunodeficiency virus infection) ([facility]), and Low testosterone in male. He also has no past medical history of Diabetes ([facility]), Hyperlipidemia, or Hypertension.  MEDS:   Current Outpatient Medications:   •  nystatin (MYCOSTATIN) 326296 UNIT/GM Cream topical cream, Apply thin layer to affected area twice daily as needed for up to 2 weeks, Disp: 45 g, Rfl: 1  •  doxycycline (VIBRAMYCIN) 100 MG Tab, Take 1 Tab by mouth 2 times a day for 7 days., Disp: 14 Tab, Rfl: 0  •  ALPRAZolam (XANAX) 0.25 MG Tab, Take 1 mg by mouth at bedtime as needed for Sleep., Disp: , Rfl:   •  PANTOPRAZOLE SODIUM PO, Take  by mouth every day., Disp: , Rfl:   •  testosterone cypionate (DEPO-TESTOSTERONE) 200 MG/ML Solution injection, 50 mg by Intramuscular route Once., Disp: , Rfl:   •  ALPRAZolam (XANAX) 1 MG Tab, TAKE 1 TABLET BY MOUTH EVERY NIGHT AT BEDTIME F51.01, 30 DAYS, Disp: , Rfl: 2  •  mirtazapine (REMERON) 15 MG Tab, 1/2-1 TABLET AT BEDTIME ONCE A DAY ORALLY 30 DAY(S), Disp: , Rfl: 0  •  pantoprazole (PROTONIX) 40 MG Tablet Delayed Response, TAKE 1 TABLET BY MOUTH ONCE A DAY 30 MINUTES BEFORE BREAKFAST MEAL, Disp: , Rfl: 2  •  ranitidine (ZANTAC) 300 MG tablet, Take 300 mg by mouth every day., Disp: , Rfl:  "12  •  risperidone (RISPERDAL) 0.25 MG Tab, TAKE 1 TABLET BY MOUTH EVERYDAY AT BEDTIME, Disp: , Rfl: 3  •  celecoxib (CELEBREX) 200 MG Cap, Take 1 Cap by mouth every day. (Patient not taking: Reported on 2/29/2020), Disp: 20 Cap, Rfl: 0  •  Eszopiclone (LUNESTA) 3 MG Tab, Take 3 mg by mouth., Disp: , Rfl:   ALLERGIES:   Allergies   Allergen Reactions   • Latex      rash   • Seasonal Itching     Sinus pressure, itchy eyes, itchy throat     SURGHX:   Past Surgical History:   Procedure Laterality Date   • FERNANDO BY LAPAROSCOPY  2/28/2018    Procedure: FERNANDO BY LAPAROSCOPY;  Surgeon: Bernadine James M.D.;  Location: SURGERY [facility];  Service: General     SOCHX:  reports that he has never smoked. He has never used smokeless tobacco. He reports current alcohol use of about 2.4 oz of alcohol per week. He reports that he does not use drugs.  FH: Reviewed with patient, not pertinent to this visit.       Review of Systems   Constitutional: Negative for chills and fever.   Skin: Positive for itching and rash.   All other systems reviewed and are negative.       Objective:   /76   Pulse 91   Temp 36.8 °C (98.2 °F) (Temporal)   Resp 16   Ht 1.753 m (5' 9\")   Wt 79.4 kg (175 lb)   SpO2 95%   BMI 25.84 kg/m²   Physical Exam  Vitals signs and nursing note reviewed.   Constitutional:       General: He is not in acute distress.     Appearance: He is well-developed.   HENT:      Head: Normocephalic and atraumatic.      Right Ear: Hearing normal.      Left Ear: Hearing normal.   Eyes:      Pupils: Pupils are equal, round, and reactive to light.   Cardiovascular:      Rate and Rhythm: Normal rate and regular rhythm.      Heart sounds: Normal heart sounds.   Pulmonary:      Effort: Pulmonary effort is normal.   Musculoskeletal:      Comments: Normal movement in all 4 extremities   Skin:     General: Skin is warm and dry.             Comments: Erythematous round rash with central clearing on the right lower leg. "   Neurological:      Mental Status: He is alert.      Coordination: Coordination normal.   Psychiatric:         Mood and Affect: Mood normal.       Assessment/Plan:   Assessment    1. Rash  - nystatin (MYCOSTATIN) 511549 UNIT/GM Cream topical cream; Apply thin layer to affected area twice daily as needed for up to 2 weeks  Dispense: 45 g; Refill: 1  - doxycycline (VIBRAMYCIN) 100 MG Tab; Take 1 Tab by mouth 2 times a day for 7 days.  Dispense: 14 Tab; Refill: 0  - REFERRAL TO DERMATOLOGY  Rash does appear somewhat consistent with ringworm but given that clotrimazole has not helped his symptoms over-the-counter I discussed with him that a yeast could also cause similar rash.  Discussed with patient he should attempt to use the nystatin as clotrimazole did not help improve the rash.  Discussed that the rash may take a couple of weeks to completely resolve.  He will start using antibiotic as discussed if rash is still persistent.  Follow-up with dermatology if needed.  Differential diagnosis, natural history, supportive care, and indications for immediate follow-up discussed.   Patient given instructions and understanding of medications and treatment.    If not improving in 3-5 days, F/U with PCP or return to  if symptoms worsen.    Patient agreeable to plan.      Please note that this dictation was created using voice recognition software. I have made every reasonable attempt to correct obvious errors, but I expect that there are errors of grammar and possibly content that I did not discover before finalizing the note.    Jerrell Fernandez PA-C

## 2020-06-23 ENCOUNTER — HOSPITAL ENCOUNTER (OUTPATIENT)
Dept: RADIOLOGY | Facility: MEDICAL CENTER | Age: 50
End: 2020-06-23
Attending: INTERNAL MEDICINE
Payer: COMMERCIAL

## 2020-06-23 DIAGNOSIS — K21.9 GASTROESOPHAGEAL REFLUX DISEASE, ESOPHAGITIS PRESENCE NOT SPECIFIED: ICD-10-CM

## 2020-06-23 DIAGNOSIS — Z79.899 LONG TERM USE OF DRUG: ICD-10-CM

## 2020-06-23 DIAGNOSIS — R12 HEARTBURN: ICD-10-CM

## 2020-06-23 DIAGNOSIS — R13.19 ESOPHAGEAL DYSPHAGIA: ICD-10-CM

## 2020-06-23 PROCEDURE — 74220 X-RAY XM ESOPHAGUS 1CNTRST: CPT

## 2020-06-23 PROCEDURE — 700117 HCHG RX CONTRAST REV CODE 255: Performed by: INTERNAL MEDICINE

## 2020-06-23 RX ADMIN — BARIUM SULFATE 700 MG: 700 TABLET ORAL at 14:28

## 2020-07-21 ENCOUNTER — OFFICE VISIT (OUTPATIENT)
Dept: URGENT CARE | Facility: CLINIC | Age: 50
End: 2020-07-21
Payer: COMMERCIAL

## 2020-07-21 VITALS
HEIGHT: 69 IN | DIASTOLIC BLOOD PRESSURE: 76 MMHG | RESPIRATION RATE: 20 BRPM | TEMPERATURE: 98 F | WEIGHT: 179 LBS | SYSTOLIC BLOOD PRESSURE: 116 MMHG | OXYGEN SATURATION: 97 % | BODY MASS INDEX: 26.51 KG/M2 | HEART RATE: 66 BPM

## 2020-07-21 DIAGNOSIS — G47.33 OSA (OBSTRUCTIVE SLEEP APNEA): ICD-10-CM

## 2020-07-21 DIAGNOSIS — G57.11 MERALGIA PARAESTHETICA, RIGHT: ICD-10-CM

## 2020-07-21 DIAGNOSIS — B20 HIV INFECTION, UNSPECIFIED SYMPTOM STATUS (HCC): ICD-10-CM

## 2020-07-21 PROCEDURE — 99213 OFFICE O/P EST LOW 20 MIN: CPT | Performed by: PHYSICIAN ASSISTANT

## 2020-07-21 RX ORDER — ALPRAZOLAM 0.5 MG/1
0.5 TABLET ORAL NIGHTLY PRN
COMMUNITY
End: 2022-02-14

## 2020-07-21 ASSESSMENT — ENCOUNTER SYMPTOMS
FEVER: 0
TINGLING: 1
SENSORY CHANGE: 1
BACK PAIN: 0
FOCAL WEAKNESS: 0

## 2020-07-21 NOTE — PATIENT INSTRUCTIONS
Meralgia Paresthetica   Meralgia paresthetica (MP) is a disorder characterized by tingling, numbness, and burning pain in the outer side of the thigh. It occurs in men more than women. MP is generally found in middle-aged or overweight people. Sometimes, the disorder may disappear.  CAUSES  The disorder is caused by a nerve in the thigh being squeezed (compressed). MP may be associated with tight clothing, pregnancy, diabetes, and being overweight (obese).  SYMPTOMS  · Tingling, numbness, and burning in the outer thigh.   · An area of the skin may be painful and sensitive to the touch.   The symptoms often worsen after walking or standing.  TREATMENT   Treatment is based on your symptoms and is mainly supportive. Treatment may include:  · Wearing looser clothing.   · Losing weight.   · Avoiding prolonged standing or walking.   · Taking medication.   · Surgery if the pain is peristent or severe.   MP usually eases or disappears after treatment. Surgery is not always fully successful.  Document Released: 12/08/2003 Document Revised: 03/11/2013 Document Reviewed: 12/18/2006  [employer]® Patient Information ©2013 [employer].

## 2021-07-30 ENCOUNTER — HOSPITAL ENCOUNTER (OUTPATIENT)
Dept: RADIOLOGY | Facility: MEDICAL CENTER | Age: 51
End: 2021-07-30
Attending: INTERNAL MEDICINE
Payer: COMMERCIAL

## 2021-07-30 DIAGNOSIS — R13.19 OTHER DYSPHAGIA: ICD-10-CM

## 2021-07-30 DIAGNOSIS — R11.10 REGURGITATION OF FOOD: ICD-10-CM

## 2021-07-30 PROCEDURE — 74248 X-RAY SM INT F-THRU STD: CPT

## 2021-08-03 ENCOUNTER — HOSPITAL ENCOUNTER (OUTPATIENT)
Dept: RADIOLOGY | Facility: MEDICAL CENTER | Age: 51
End: 2021-08-03
Attending: INTERNAL MEDICINE
Payer: COMMERCIAL

## 2021-08-03 DIAGNOSIS — R13.19 OTHER DYSPHAGIA: ICD-10-CM

## 2021-08-03 DIAGNOSIS — R11.10 REGURGITATION OF FOOD: ICD-10-CM

## 2021-08-03 PROCEDURE — A9541 TC99M SULFUR COLLOID: HCPCS

## 2022-02-14 ENCOUNTER — APPOINTMENT (OUTPATIENT)
Dept: RADIOLOGY | Facility: IMAGING CENTER | Age: 52
End: 2022-02-14
Attending: FAMILY MEDICINE
Payer: COMMERCIAL

## 2022-02-14 ENCOUNTER — OFFICE VISIT (OUTPATIENT)
Dept: URGENT CARE | Facility: CLINIC | Age: 52
End: 2022-02-14
Payer: COMMERCIAL

## 2022-02-14 VITALS
WEIGHT: 185 LBS | DIASTOLIC BLOOD PRESSURE: 96 MMHG | SYSTOLIC BLOOD PRESSURE: 144 MMHG | BODY MASS INDEX: 27.4 KG/M2 | HEIGHT: 69 IN | RESPIRATION RATE: 16 BRPM | TEMPERATURE: 98.5 F | HEART RATE: 83 BPM | OXYGEN SATURATION: 96 %

## 2022-02-14 DIAGNOSIS — R10.9 BELLY PAIN: ICD-10-CM

## 2022-02-14 PROCEDURE — 99214 OFFICE O/P EST MOD 30 MIN: CPT | Performed by: FAMILY MEDICINE

## 2022-02-14 PROCEDURE — 74018 RADEX ABDOMEN 1 VIEW: CPT | Mod: TC,FY | Performed by: FAMILY MEDICINE

## 2022-02-14 RX ORDER — HYOSCYAMINE SULFATE 0.125 MG
125 TABLET ORAL EVERY 8 HOURS PRN
Qty: 12 TABLET | Refills: 0 | Status: SHIPPED | OUTPATIENT
Start: 2022-02-14 | End: 2023-01-10

## 2022-02-14 NOTE — PATIENT INSTRUCTIONS
Discharge Instructions:  Certainly if not improving in the next 48-72 hours or developing new/concerning symptoms or getting/feeling worse (such as nausea, vomiting, fever/chills, chest pain, shortness of breath) return to walk-in clinic or ER immediately for in-person evaluation.    Please see your primary care doctor regularly for routine check-ups and call/visit with your primary care doctor to follow up on this visit to make sure you are improving and no additional treatment or on-going work up is needed. If you do not have a primary care doctor and need to schedule one you may call [facility] at [phone] to do this.

## 2022-02-14 NOTE — PROGRESS NOTES
"Subjective     Vinicio Latham is a 51 y.o. male who presents with LUQ Pain (x1 day, swelling and painful burning feeling )      - This is a pleasant and nontoxic appearing 51 y.o. male who has come to the walk-in clinic today for:    #1) LUQ discomfort noticed this morning, felt swollen and sore. Has gone down some since then. Has been able to eat/drink, no stool changes or NVFC. Says has had this in past and Levsin has helped.       ALLERGIES:  Latex and Seasonal     PMH:  Past Medical History:   Diagnosis Date   • Bowel habit changes     constipation and diarrhea   • Chickenpox    • Heart burn    • HIV (human immunodeficiency virus infection) (HCC)     Diagnosed in [place], has never been on ART or had AIDS defining illness.   • Low testosterone in male         PSH:  Past Surgical History:   Procedure Laterality Date   • FERNANDO BY LAPAROSCOPY  2/28/2018    Procedure: FERNANDO BY LAPAROSCOPY;  Surgeon: Bernadine James M.D.;  Location: SURGERY [facility];  Service: General       MEDS:    Current Outpatient Medications:   •  hyoscyamine (LEVSIN) 0.125 MG tablet, Take 1 Tablet by mouth every 8 hours as needed for Cramping., Disp: 12 Tablet, Rfl: 0  •  ALPRAZolam (XANAX) 1 MG Tab, TAKE 1 TABLET BY MOUTH EVERY NIGHT AT BEDTIME F51.01, 30 DAYS, Disp: , Rfl: 2  •  PANTOPRAZOLE SODIUM PO, Take  by mouth every day., Disp: , Rfl:   •  testosterone cypionate (DEPO-TESTOSTERONE) 200 MG/ML Solution injection, 50 mg by Intramuscular route Once., Disp: , Rfl:     ** I have documented what I find to be significant in regards to past medical, social, family and surgical history  in my HPI or under PMH/PSH/FH review section, otherwise it is noncontributory **           HPI    Review of Systems   All other systems reviewed and are negative.             Objective     /96   Pulse 83   Temp 36.9 °C (98.5 °F) (Temporal)   Resp 16   Ht 1.753 m (5' 9\")   Wt 83.9 kg (185 lb)   SpO2 96%   BMI 27.32 kg/m²      Physical " Exam  Vitals and nursing note reviewed.   Constitutional:       General: He is not in acute distress.     Appearance: Normal appearance. He is well-developed.   HENT:      Head: Normocephalic and atraumatic.   Eyes:      General: No scleral icterus.  Cardiovascular:      Heart sounds: Normal heart sounds. No murmur heard.      Pulmonary:      Effort: Pulmonary effort is normal. No respiratory distress.      Breath sounds: Normal breath sounds.   Abdominal:      General: There is no distension.      Palpations: Abdomen is soft.      Tenderness: There is abdominal tenderness ( mild LUQ). There is no guarding or rebound.   Skin:     Coloration: Skin is not jaundiced or pale.   Neurological:      Mental Status: He is alert.      Motor: No abnormal muscle tone.   Psychiatric:         Mood and Affect: Mood normal.         Behavior: Behavior normal.                             Assessment & Plan       1. Belly pain  [id number] VIEW    hyoscyamine (LEVSIN) 0.125 MG tablet     Xray: no acute findings by MY READ. RADIOLOGY READ PENDING.     May be cramps or constipation related     - Dx, plan & d/c instructions discussed   - Rest, stay hydrated, prune juice  - E.R. precautions discussed     Asked to kindly follow up with their PCP's office in 2-3 days for a recheck, ER if not improving or feeling/getting worse. If you do not have a primary care doctor and need to schedule one you may call [facility] at [phone] to do this.    Any realistic side effects of medications that may have been given today reviewed.     Patient left in stable condition     Today's radiology imaging personally reviewed by me today on day of visit and Radiology readings reviewed and discussed w/ patient today.

## 2022-03-30 ENCOUNTER — HOSPITAL ENCOUNTER (OUTPATIENT)
Dept: RADIOLOGY | Facility: MEDICAL CENTER | Age: 52
End: 2022-03-30
Attending: INTERNAL MEDICINE
Payer: COMMERCIAL

## 2022-03-30 DIAGNOSIS — R79.89 ELEVATED SERUM CREATININE: ICD-10-CM

## 2022-04-15 ENCOUNTER — HOSPITAL ENCOUNTER (OUTPATIENT)
Facility: MEDICAL CENTER | Age: 52
End: 2022-04-15
Attending: SURGERY | Admitting: SURGERY
Payer: COMMERCIAL

## 2022-08-05 ENCOUNTER — APPOINTMENT (OUTPATIENT)
Dept: NEUROLOGY | Facility: MEDICAL CENTER | Age: 52
End: 2022-08-05
Attending: PSYCHIATRY & NEUROLOGY
Payer: COMMERCIAL

## 2022-09-19 NOTE — PROGRESS NOTES
"Subjective:   Darrell Latham  is a 50 y.o. male who presents for Leg Problem (right upper leg, tingling, started wtih numbness first, burning like sensation, white spots x 2 months)    This is a new problem.  Patient presents to urgent care with 2-month history of decreased sensation, tingling, sharp stabbing pain in the right upper lateral thigh.  Patient denies any injury.  Denies back pain.  Denies any unexplained fevers.  Patient reports that he perceives several small pinpoint \"white spots\" along this area that he is concerned may somehow be related.    Of note, patient is HIV positive.  He is managed by infectious disease for this problem.  He does mention however that he does not currently have a primary care provider and desires a referral to establish with primary care for routine health care management.      HPI  Review of Systems   Constitutional: Negative for fever.   Musculoskeletal: Negative for back pain.   Skin: Positive for rash.   Neurological: Positive for tingling and sensory change. Negative for focal weakness.   All other systems reviewed and are negative.    Allergies   Allergen Reactions   • Latex      rash   • Seasonal Itching     Sinus pressure, itchy eyes, itchy throat     Reviewed past medical, surgical , social and family history.  Reviewed prescription and over-the-counter medications with patient and electronic health record today.     Objective:   /76 (BP Location: Right arm, Patient Position: Sitting, BP Cuff Size: Adult long)   Pulse 66   Temp 36.7 °C (98 °F) (Temporal)   Resp 20   Ht 1.753 m (5' 9\")   Wt 81.2 kg (179 lb)   SpO2 97%   BMI 26.43 kg/m²   Physical Exam  Vitals signs reviewed.   Constitutional:       Appearance: He is well-developed. He is not ill-appearing or toxic-appearing.   HENT:      Head: Normocephalic and atraumatic.      Right Ear: Tympanic membrane, ear canal and external ear normal.      Left Ear: Tympanic membrane, ear canal and external " ear normal.      Nose: Nose normal.      Mouth/Throat:      Lips: Pink.      Mouth: Mucous membranes are moist.      Pharynx: Uvula midline. No oropharyngeal exudate.   Eyes:      Conjunctiva/sclera: Conjunctivae normal.      Pupils: Pupils are equal, round, and reactive to light.   Neck:      Musculoskeletal: Normal range of motion and neck supple.   Cardiovascular:      Rate and Rhythm: Normal rate and regular rhythm.      Pulses:           Femoral pulses are 2+ on the right side.       Dorsalis pedis pulses are 2+ on the right side.        Posterior tibial pulses are 2+ on the right side.      Heart sounds: Normal heart sounds. No murmur. No friction rub.   Pulmonary:      Effort: Pulmonary effort is normal. No respiratory distress.      Breath sounds: Normal breath sounds.   Abdominal:      General: Bowel sounds are normal.      Palpations: Abdomen is soft.      Tenderness: There is no abdominal tenderness.   Musculoskeletal: Normal range of motion.   Lymphadenopathy:      Head:      Right side of head: No submental, submandibular or tonsillar adenopathy.      Left side of head: No submental, submandibular or tonsillar adenopathy.      Cervical: No cervical adenopathy.      Upper Body:      Right upper body: No supraclavicular adenopathy.      Left upper body: No supraclavicular adenopathy.      Lower Body: No right inguinal adenopathy. No left inguinal adenopathy.   Skin:     General: Skin is warm and dry.      Findings: No rash.   Neurological:      Mental Status: He is alert and oriented to person, place, and time.      Cranial Nerves: Cranial nerves are intact. No cranial nerve deficit.      Motor: Motor function is intact.      Coordination: Coordination is intact. Coordination normal.      Gait: Gait is intact.      Deep Tendon Reflexes:      Reflex Scores:       Patellar reflexes are 2+ on the right side and 2+ on the left side.       Achilles reflexes are 2+ on the right side and 2+ on the left side.      Comments: Decreased sensation right upper lateral thigh compared to left.   Psychiatric:         Attention and Perception: Attention normal.         Mood and Affect: Mood normal.         Speech: Speech normal.         Behavior: Behavior normal.         Thought Content: Thought content normal.         Judgment: Judgment normal.           Assessment/Plan:   1. Meralgia paraesthetica, right  - REFERRAL TO FOLLOW-UP WITH PRIMARY CARE    2. HIV infection, unspecified symptom status (HCC)  - REFERRAL TO FOLLOW-UP WITH PRIMARY CARE    3. BUTCH (obstructive sleep apnea)  - REFERRAL TO FOLLOW-UP WITH PRIMARY CARE    Reassurance provided.  Printed information with patient education on meralgia paresthetica provided.  Recommend follow-up with primary care.  Avoid tight fitting clothing.    Referral placed to establish care with primary care.    Upon entering exam room I ensured patient was wearing a mask.  This provider wore appropriate PPE throughout entire visit.  Patient wore mask entire visit except for a brief period while examining oropharynx.    Patient was seen in collaboration with ARAVIND Sotelo PA-S    I have personally examined the patient, developed a differential diagnosis and established a treatment plan.     Differential diagnosis, natural history, supportive care, and indications for immediate follow-up discussed.     Red flag warning symptoms and strict ER/follow-up precautions given.  The patient demonstrated a good understanding and agreed with the treatment plan.  Please note that this note was created using voice recognition speech to text software. Every effort has been made to correct obvious errors.  However, I expect there are errors of grammar and possibly context that were not discovered prior to finalizing the note  KUNAL Vasquez PA-C   [Medium Joint Injection] : medium joint injection [Bilateral] : bilaterally of the [CMC Joint] : CMC joint [Pain] : pain [Inflammation] : inflammation [Alcohol] : alcohol [Ethyl Chloride sprayed topically] : ethyl chloride sprayed topically [Sterile technique used] : sterile technique used [___ cc    1%] : Lidocaine ~Vcc of 1%  [___ cc    10mg] : Triamcinolone (Kenalog) ~Vcc of 10 mg  [Risks, benefits, alternatives discussed / Verbal consent obtained] : the risks benefits, and alternatives have been discussed, and verbal consent was obtained

## 2022-11-15 ENCOUNTER — OFFICE VISIT (OUTPATIENT)
Dept: URGENT CARE | Facility: CLINIC | Age: 52
End: 2022-11-15
Payer: COMMERCIAL

## 2022-11-15 VITALS
SYSTOLIC BLOOD PRESSURE: 118 MMHG | RESPIRATION RATE: 18 BRPM | HEART RATE: 74 BPM | OXYGEN SATURATION: 100 % | DIASTOLIC BLOOD PRESSURE: 76 MMHG | WEIGHT: 185 LBS | TEMPERATURE: 97 F | HEIGHT: 69 IN | BODY MASS INDEX: 27.4 KG/M2

## 2022-11-15 DIAGNOSIS — U07.1 COVID-19: ICD-10-CM

## 2022-11-15 DIAGNOSIS — J06.9 VIRAL URI WITH COUGH: ICD-10-CM

## 2022-11-15 PROCEDURE — 99214 OFFICE O/P EST MOD 30 MIN: CPT | Performed by: PHYSICIAN ASSISTANT

## 2022-11-15 RX ORDER — BUPROPION HYDROCHLORIDE 100 MG/1
100 TABLET ORAL DAILY
COMMUNITY
Start: 2022-10-20

## 2022-11-15 RX ORDER — DEXTROMETHORPHAN HYDROBROMIDE AND PROMETHAZINE HYDROCHLORIDE 15; 6.25 MG/5ML; MG/5ML
5 SYRUP ORAL NIGHTLY PRN
Qty: 120 ML | Refills: 0 | Status: SHIPPED | OUTPATIENT
Start: 2022-11-15 | End: 2023-05-02

## 2022-11-15 RX ORDER — DOLUTEGRAVIR SODIUM AND LAMIVUDINE 50; 300 MG/1; MG/1
TABLET, FILM COATED ORAL
COMMUNITY
End: 2023-01-10

## 2022-11-15 RX ORDER — ATOMOXETINE 25 MG/1
CAPSULE ORAL
COMMUNITY
Start: 2022-11-10 | End: 2023-01-10

## 2022-11-15 RX ORDER — PANTOPRAZOLE SODIUM 40 MG/1
TABLET, DELAYED RELEASE ORAL
COMMUNITY
Start: 2022-10-29 | End: 2022-11-15

## 2022-11-15 RX ORDER — TADALAFIL 20 MG/1
20 TABLET ORAL
COMMUNITY
Start: 2022-09-20 | End: 2023-01-10

## 2022-11-15 RX ORDER — DOLUTEGRAVIR SODIUM AND LAMIVUDINE 50; 300 MG/1; MG/1
1 TABLET, FILM COATED ORAL DAILY
COMMUNITY
Start: 2022-10-17

## 2022-11-15 RX ORDER — DEXAMETHASONE 6 MG/1
6 TABLET ORAL DAILY
Qty: 5 TABLET | Refills: 0 | Status: SHIPPED | OUTPATIENT
Start: 2022-11-15 | End: 2022-11-20

## 2022-11-15 RX ORDER — TEMAZEPAM 30 MG/1
CAPSULE ORAL
COMMUNITY
Start: 2022-09-21 | End: 2023-01-10

## 2022-11-15 RX ORDER — BENZONATATE 100 MG/1
100 CAPSULE ORAL 3 TIMES DAILY PRN
Qty: 60 CAPSULE | Refills: 0 | Status: SHIPPED | OUTPATIENT
Start: 2022-11-15 | End: 2023-01-24

## 2022-11-15 ASSESSMENT — ENCOUNTER SYMPTOMS
FEVER: 0
COUGH: 1
NAUSEA: 0
SHORTNESS OF BREATH: 0
SORE THROAT: 1
HEADACHES: 1
VOMITING: 0
EYE REDNESS: 0
MYALGIAS: 1
EYE DISCHARGE: 0
WHEEZING: 0
DIARRHEA: 0

## 2022-11-15 NOTE — PROGRESS NOTES
Subjective     Vinicio Latham is a 52 y.o. male who presents with Headache (Yesterday, tested positive for Covid-19 at home ), Sore Throat, and Cough (Throat hurt from coughing )            URI   This is a new problem. The current episode started yesterday. The problem has been unchanged. There has been no fever. Associated symptoms include congestion, coughing, headaches and a sore throat. Pertinent negatives include no diarrhea, ear pain, nausea, vomiting or wheezing. He has tried acetaminophen for the symptoms.     The patient states he recently returned from a cruise x 2 weeks ago.     The patient reports no known exposure to COVID-19 and/or influenza.  The patient states he is taken 2 at home COVID-19 test, both of which were positive.      PMH:  has a past medical history of Bowel habit changes, Chickenpox, Heart burn, HIV (human immunodeficiency virus infection) (HCC), and Low testosterone in male.    He has no past medical history of Diabetes ([facility]), Hyperlipidemia, or Hypertension.  MEDS:   Current Outpatient Medications:     DOVATO  MG Tab, Take 1 Tablet by mouth every day., Disp: , Rfl:     dolutegravir-lamiVUDine (DOVATO)  MG Tab, , Disp: , Rfl:     buPROPion (WELLBUTRIN) 100 MG Tab, Take 1 Tablet by mouth every day., Disp: , Rfl:     atomoxetine (STRATTERA) 25 MG capsule, , Disp: , Rfl:     ALPRAZolam (XANAX) 1 MG Tab, TAKE 1 TABLET BY MOUTH EVERY NIGHT AT BEDTIME F51.01, 30 DAYS, Disp: , Rfl: 2    PANTOPRAZOLE SODIUM PO, Take  by mouth every day., Disp: , Rfl:     testosterone cypionate (DEPO-TESTOSTERONE) 200 MG/ML Solution injection, Inject 0.25 mL into the shoulder, thigh, or buttocks one time., Disp: , Rfl:     temazepam (RESTORIL) 30 MG capsule, TAKE 1 CAPSULE BY MOUTH ONCE A DAY AT BEDTIME FOR 30 DAYS. F51.01 (Patient not taking: Reported on 11/15/2022), Disp: , Rfl:     tadalafil (CIALIS) 20 MG tablet, Take 1 Tablet by mouth 1 time a day as needed. (Patient not taking: Reported on  "11/15/2022), Disp: , Rfl:     hyoscyamine (LEVSIN) 0.125 MG tablet, Take 1 Tablet by mouth every 8 hours as needed for Cramping. (Patient not taking: Reported on 11/15/2022), Disp: 12 Tablet, Rfl: 0  ALLERGIES:   Allergies   Allergen Reactions    Latex      rash    Seasonal Itching     Sinus pressure, itchy eyes, itchy throat     SURGHX:   Past Surgical History:   Procedure Laterality Date    FERNANDO BY LAPAROSCOPY  2/28/2018    Procedure: FERNANDO BY LAPAROSCOPY;  Surgeon: Bernadine James M.D.;  Location: SURGERY [facility];  Service: General     SOCHX:  reports that he has never smoked. He has never used smokeless tobacco. He reports current alcohol use of about 2.4 oz per week. He reports that he does not use drugs.  FH: Family history was reviewed, no pertinent findings to report    Review of Systems   Constitutional:  Positive for malaise/fatigue. Negative for fever.   HENT:  Positive for congestion and sore throat. Negative for ear pain.    Eyes:  Negative for discharge and redness.   Respiratory:  Positive for cough. Negative for shortness of breath and wheezing.    Gastrointestinal:  Negative for diarrhea, nausea and vomiting.   Musculoskeletal:  Positive for myalgias.   Neurological:  Positive for headaches.            Objective     /76   Pulse 74   Temp 36.1 °C (97 °F) (Temporal)   Resp 18   Ht 1.753 m (5' 9\")   Wt 83.9 kg (185 lb)   SpO2 100%   BMI 27.32 kg/m²      Physical Exam  Constitutional:       General: He is not in acute distress.     Appearance: Normal appearance. He is not ill-appearing.   HENT:      Head: Normocephalic and atraumatic.      Right Ear: External ear normal.      Left Ear: External ear normal.      Nose: Nose normal.      Mouth/Throat:      Mouth: Mucous membranes are moist.      Pharynx: Oropharynx is clear. No posterior oropharyngeal erythema.   Eyes:      Extraocular Movements: Extraocular movements intact.      Conjunctiva/sclera: Conjunctivae normal. "   Cardiovascular:      Rate and Rhythm: Normal rate and regular rhythm.      Heart sounds: Normal heart sounds.   Pulmonary:      Effort: Pulmonary effort is normal. No respiratory distress.      Breath sounds: Normal breath sounds. No wheezing.   Musculoskeletal:         General: Normal range of motion.      Cervical back: Normal range of motion and neck supple.   Skin:     General: Skin is warm and dry.   Neurological:      Mental Status: He is alert and oriented to person, place, and time.                           Assessment & Plan        1. Viral URI with cough  - benzonatate (TESSALON) 100 MG Cap; Take 1 Capsule by mouth 3 times a day as needed for Cough.  Dispense: 60 Capsule; Refill: 0  - promethazine-dextromethorphan (PROMETHAZINE-DM) 6.25-15 MG/5ML syrup; Take 5 mL by mouth at bedtime as needed for Cough.  Dispense: 120 mL; Refill: 0  --Advised the patient to only take this medication at night, as it may cause drowsiness. Instructed the patient not to take the medication while at work, driving, or operating machinery.  Instructed the patient not to drink alcohol while taking this medication.    2. COVID-19  - dexamethasone (DECADRON) 6 MG Tab; Take 1 Tablet by mouth every day for 5 days.  Dispense: 5 Tablet; Refill: 0    The patient's presenting symptoms and physical exam findings are consistent with a viral URI with associated cough.  The patient recently tested positive for COVID-19.  The patient's physical exam today in clinic was normal.  The patient is nontoxic and appears in no acute distress.  The patient's vital signs are stable and within normal limits.  He is afebrile today in clinic.  Discussed likely viral etiology with the patient.  Informed the patient that his symptoms are consistent with a COVID-19 illness.  Advised patient stay at home under self quarantine per CDC guidelines.  The patient initially was interested in treatment with Paxlovid.  Reviewed the patient current medication  regimen.  Unfortunately, due to the patient's antivirals for HIV -he is not a candidate for treatment with Paxlovid.  Will prescribe the patient a short course of dexamethasone for his COVID-19 illness.  We will also prescribe the patient Tessalon and Promethazine DM for symptomatic leaf of his cough.  Advised the patient to take the Promethazine DM at night, as it may cause drowsiness.  Recommend OTC medications and supportive care for symptomatic management.  Recommend patient follow-up with PCP as needed.  Discussed return precautions with the patient, and he verbalized understanding.    Differential diagnoses, supportive care, and indications for immediate follow-up discussed with patient.   Instructed to return to clinic or nearest emergency department for any change in condition, further concerns, or worsening of symptoms.    OTC Tylenol or Motrin for fever/discomfort.  OTC cough/cold medication for symptomatic relief  OTC Supportive Care for Congestion - saline nasal spray or neti pot  Drink plenty of fluids  Follow-up with PCP  Return to clinic or go to the ED if symptoms worsen or fail to improve, or if the patient should develop worsening/increasing cough, congestion, ear pain, sore throat, shortness of breath, wheezing, chest pain, fever/chills, and/or any concerning symptoms.    Discussed plan with the patient, and he agrees to the above.    I personally reviewed prior external notes and test results pertinent to today's visit.  I have independently reviewed and interpreted all diagnostics ordered during this urgent care visit.     Please note that this dictation was created using voice recognition software. I have made every reasonable attempt to correct obvious errors, but I expect that there may be errors of grammar and possibly content that I did not discover before finalizing the note.     This note was electronically signed by Yuki Chung PA-C

## 2023-01-10 ENCOUNTER — OFFICE VISIT (OUTPATIENT)
Dept: URGENT CARE | Facility: CLINIC | Age: 53
End: 2023-01-10
Payer: COMMERCIAL

## 2023-01-10 VITALS
HEART RATE: 80 BPM | WEIGHT: 180 LBS | HEIGHT: 69 IN | BODY MASS INDEX: 26.66 KG/M2 | TEMPERATURE: 97.2 F | OXYGEN SATURATION: 98 % | DIASTOLIC BLOOD PRESSURE: 94 MMHG | SYSTOLIC BLOOD PRESSURE: 138 MMHG | RESPIRATION RATE: 18 BRPM

## 2023-01-10 DIAGNOSIS — R14.2 BELCHING: ICD-10-CM

## 2023-01-10 DIAGNOSIS — R03.0 ELEVATED BLOOD PRESSURE READING: ICD-10-CM

## 2023-01-10 DIAGNOSIS — R11.10 REGURGITATION OF FOOD: ICD-10-CM

## 2023-01-10 PROCEDURE — 99214 OFFICE O/P EST MOD 30 MIN: CPT | Performed by: FAMILY MEDICINE

## 2023-01-10 NOTE — PROGRESS NOTES
"  Subjective:      52 y.o. male presents to urgent care for excessive belching that started on Friday.  There is no inciting event or trauma at that time.  He states the increased belching is worse with eating and drinking.  He denies eating quickly.  No associated heartburn, he is already taking pantoprazole 40 mg daily.  He does not chew, or smoke cigarettes.  Does have some anxiety mostly over work, but states not currently having any.  He denies any recent weight loss, abdominal pain, dysphagia, heartburn.  He does endorse regurgitation of a slimy liquid after eating.  She also notes that his stomach has been feeling rotten lately.    Blood pressure is elevated today in urgent care.  He does not have a history of hypertension and does not take any medication for this.  He denies any chest pain, palpitations, shortness of breath.    He denies any other questions or concerns at this time.    Current problem list, medication, and past medical/surgical history were reviewed in Epic.    ROS  See HPI     Objective:      BP (!) 138/94   Pulse 80   Temp 36.2 °C (97.2 °F) (Temporal)   Resp 18   Ht 1.753 m (5' 9\")   Wt 81.6 kg (180 lb)   SpO2 98%   BMI 26.58 kg/m²     Physical Exam  Constitutional:       General: He is not in acute distress.     Appearance: He is not diaphoretic.   Cardiovascular:      Rate and Rhythm: Normal rate and regular rhythm.      Heart sounds: Normal heart sounds.   Pulmonary:      Effort: Pulmonary effort is normal. No respiratory distress.      Breath sounds: Normal breath sounds.   Abdominal:      General: Bowel sounds are normal.      Palpations: Abdomen is soft.      Tenderness: There is no abdominal tenderness. There is no right CVA tenderness or left CVA tenderness.   Neurological:      Mental Status: He is alert.   Psychiatric:         Mood and Affect: Affect normal.         Judgment: Judgment normal.     Assessment/Plan:     1. Belching  2. Regurgitation of food  Patient does have " the alarm feature of regurgitation.  Referral to gastroenterology has been placed.  Continue with pantoprazole daily.  - Referral to Gastroenterology    3. Elevated blood pressure reading  Asymptomatic.  Follow with PCP.      Instructed to return to Urgent Care or nearest Emergency Department if symptoms fail to improve, for any change in condition, further concerns, or new concerning symptoms. Patient states understanding of the plan of care and discharge instructions.    Meli Izaguirre M.D.

## 2023-01-24 ENCOUNTER — OFFICE VISIT (OUTPATIENT)
Dept: URGENT CARE | Facility: CLINIC | Age: 53
End: 2023-01-24
Payer: COMMERCIAL

## 2023-01-24 VITALS
HEIGHT: 69 IN | RESPIRATION RATE: 16 BRPM | DIASTOLIC BLOOD PRESSURE: 92 MMHG | HEART RATE: 88 BPM | WEIGHT: 180 LBS | BODY MASS INDEX: 26.66 KG/M2 | TEMPERATURE: 97.1 F | OXYGEN SATURATION: 98 % | SYSTOLIC BLOOD PRESSURE: 128 MMHG

## 2023-01-24 DIAGNOSIS — B37.0 ORAL THRUSH: ICD-10-CM

## 2023-01-24 PROCEDURE — 99213 OFFICE O/P EST LOW 20 MIN: CPT | Performed by: NURSE PRACTITIONER

## 2023-01-24 RX ORDER — TEMAZEPAM 30 MG/1
30 CAPSULE ORAL
COMMUNITY
Start: 2023-01-11

## 2023-01-24 RX ORDER — CLOTRIMAZOLE 10 MG/1
10 LOZENGE ORAL; TOPICAL
Qty: 35 TROCHE | Refills: 0 | Status: SHIPPED | OUTPATIENT
Start: 2023-01-24 | End: 2023-01-31

## 2023-01-24 ASSESSMENT — ENCOUNTER SYMPTOMS
FEVER: 0
SORE THROAT: 0
CONSTITUTIONAL NEGATIVE: 1

## 2023-01-24 ASSESSMENT — VISUAL ACUITY: OU: 1

## 2023-01-24 NOTE — PROGRESS NOTES
Subjective:     Darrell Latham is a 52 y.o. male who presents for Other (X5days, Bit tongue other day, and had a white ball washed mouth out with mouth wash, stats now he has more on tongue and has a white film on tongue )       Other  This is a new problem. The problem has been gradually worsening. Pertinent negatives include no fever or sore throat.     On Thursday, patient accidentally bit his tongue while chewing food.  Friday, started to experience swelling of his lateral tongue.  He thought it was a canker sore.  However, reports that had a bulb-like appearance with drainage.  Several other lesions appreciated on his tongue.  Reports having had a white film on his tongue with burning sensation which also affected his his inner lips.  Symptoms seem to be improving at this time.  However, concerned of infection.  Has been using Listerine.    Review of Systems   Constitutional: Negative.  Negative for fever.   HENT:  Negative for sore throat.         Oral lesions, film, burning   All other systems reviewed and are negative.    Refer to HPI for additional details.    During this visit, appropriate PPE was worn, hand hygiene was performed, and the patient and any visitors were masked.    PMH:  has a past medical history of Bowel habit changes, Chickenpox, Heart burn, HIV (human immunodeficiency virus infection) (HCC), and Low testosterone in male.    He has no past medical history of Diabetes (HCC), Hyperlipidemia, or Hypertension.    MEDS:   Current Outpatient Medications:     temazepam (RESTORIL) 30 MG capsule, Take 30 mg by mouth at bedtime., Disp: , Rfl:     clotrimazole (MYCELEX) 10 MG Char char, Take 1 Char by mouth 5 Times a Day for 7 days., Disp: 35 Char, Rfl: 0    DOVATO  MG Tab, Take 1 Tablet by mouth every day., Disp: , Rfl:     buPROPion (WELLBUTRIN) 100 MG Tab, Take 1 Tablet by mouth every day., Disp: , Rfl:     ALPRAZolam (XANAX) 1 MG Tab, TAKE 1 TABLET BY MOUTH EVERY NIGHT AT  "BEDTIME F51.01, 30 DAYS, Disp: , Rfl: 2    PANTOPRAZOLE SODIUM PO, Take  by mouth every day., Disp: , Rfl:     testosterone cypionate (DEPO-TESTOSTERONE) 200 MG/ML Solution injection, Inject 0.25 mL into the shoulder, thigh, or buttocks one time., Disp: , Rfl:     promethazine-dextromethorphan (PROMETHAZINE-DM) 6.25-15 MG/5ML syrup, Take 5 mL by mouth at bedtime as needed for Cough. (Patient not taking: Reported on 1/10/2023), Disp: 120 mL, Rfl: 0    ALLERGIES:   Allergies   Allergen Reactions    Latex      rash    Seasonal Itching     Sinus pressure, itchy eyes, itchy throat     SURGHX:   Past Surgical History:   Procedure Laterality Date    FERNANDO BY LAPAROSCOPY  2/28/2018    Procedure: FERNANDO BY LAPAROSCOPY;  Surgeon: Bernadine James M.D.;  Location: SURGERY [facility];  Service: General     SOCHX:  reports that he has never smoked. He has never used smokeless tobacco. He reports current alcohol use of about 2.4 oz per week. He reports that he does not use drugs.    FH: Per HPI as applicable/pertinent.      Objective:     BP (!) 128/92   Pulse 88   Temp 36.2 °C (97.1 °F) (Temporal)   Resp 16   Ht 1.753 m (5' 9\")   Wt 81.6 kg (180 lb)   SpO2 98%   BMI 26.58 kg/m²     Physical Exam  Nursing note reviewed.   Constitutional:       General: He is not in acute distress.     Appearance: He is well-developed. He is not ill-appearing or toxic-appearing.   HENT:      Mouth/Throat:      Mouth: Mucous membranes are moist. No oral lesions.      Tongue: Lesions (Minimal swelling at lateral tongue, no erythema, fluctuance, or discharge) present.      Pharynx: Oropharynx is clear. Uvula midline. No pharyngeal swelling or posterior oropharyngeal erythema.   Eyes:      General: Vision grossly intact.   Cardiovascular:      Rate and Rhythm: Normal rate.   Pulmonary:      Effort: Pulmonary effort is normal. No respiratory distress.      Comments: Phonation normal, speaks in full sentences, no audible wheeze or stridor "   Musculoskeletal:         General: No deformity. Normal range of motion.   Skin:     Coloration: Skin is not pale.   Neurological:      Mental Status: He is alert and oriented to person, place, and time.      Motor: No weakness.   Psychiatric:         Behavior: Behavior normal. Behavior is cooperative.       Assessment/Plan:     1. Oral thrush  - clotrimazole (MYCELEX) 10 MG Brayan brayan; Take 1 Brayan by mouth 5 Times a Day for 7 days.  Dispense: 35 Brayan; Refill: 0    Vital signs stable, afebrile, no acute distress at this time. Minimal swelling at lateral tongue where patient indicates. Otherwise, no definitive lesions at this time. Symptoms patient describes descriptive of thrush. Improving at this time, but treat empirically. Advised to continue Listerine.    Differential diagnosis, natural history, supportive care, over-the-counter symptom management per 's instructions, close monitoring, and indications for immediate follow-up discussed.     Warning signs reviewed. Return precautions discussed.     All questions answered. Patient agrees with the plan of care.

## 2023-05-02 ENCOUNTER — OFFICE VISIT (OUTPATIENT)
Dept: URGENT CARE | Facility: CLINIC | Age: 53
End: 2023-05-02
Payer: COMMERCIAL

## 2023-05-02 VITALS
BODY MASS INDEX: 26.66 KG/M2 | WEIGHT: 180 LBS | HEART RATE: 92 BPM | HEIGHT: 69 IN | OXYGEN SATURATION: 96 % | TEMPERATURE: 100 F | DIASTOLIC BLOOD PRESSURE: 74 MMHG | SYSTOLIC BLOOD PRESSURE: 104 MMHG

## 2023-05-02 DIAGNOSIS — K52.9 GASTROENTERITIS: ICD-10-CM

## 2023-05-02 PROCEDURE — 99213 OFFICE O/P EST LOW 20 MIN: CPT | Performed by: FAMILY MEDICINE

## 2023-05-02 NOTE — LETTER
May 2, 2023    To Whom It May Concern:         This is confirmation that Darrell Mooney Latham attended his scheduled appointment with Meli Izaguirre M.D. on 5/02/23. He may return to work Thursday without any restrictions.          If you have any questions please do not hesitate to call me at the phone number listed below.    Sincerely,          Meli Izaguirre M.D.  [phone]

## 2023-05-02 NOTE — PROGRESS NOTES
"  Subjective:      52 y.o. male presents to urgent care for GI upset that started early Sunday morning. There was no inciting event or trauma then. He has nausea, but no vomiting. There is diarrhea without any blood in the stool.  He has associated abdominal pain, it is generalized, intermittent, described as cramping and bloating, currently rated 6/10.  No changes to urinary urgency, frequency, dysuria, or hematuria.  He was on a cruise at the end of March.  No recent antibiotic use, change in diet, or exposure to exotic animals.  Has had prior cholecystectomy, otherwise no history of abdominal surgeries.    He denies any other questions or concerns at this time.    Current problem list, medication, and past medical/surgical history were reviewed in Epic.    ROS  See HPI     Objective:      /74 (BP Location: Left arm, Patient Position: Sitting, BP Cuff Size: Adult)   Pulse 92   Temp 37.8 °C (100 °F) (Temporal)   Ht 1.753 m (5' 9\")   Wt 81.6 kg (180 lb)   SpO2 96%   BMI 26.58 kg/m²     Physical Exam  Constitutional:       General: He is not in acute distress.     Appearance: He is not diaphoretic.   Cardiovascular:      Rate and Rhythm: Normal rate and regular rhythm.      Heart sounds: Normal heart sounds.   Pulmonary:      Effort: Pulmonary effort is normal. No respiratory distress.      Breath sounds: Normal breath sounds.   Abdominal:      General: Bowel sounds are normal.      Tenderness: There is no abdominal tenderness. There is no right CVA tenderness or left CVA tenderness.   Neurological:      Mental Status: He is alert.   Psychiatric:         Mood and Affect: Affect normal.         Judgment: Judgment normal.     Assessment/Plan:     1. Gastroenteritis  No red flag warning signs.  Patient encouraged to stay hydrated well enough to urinate at least 3 times in a 24-hour period.  Follow-up if symptoms persist greater then 2 weeks total.      Instructed to return to Urgent Care or nearest Emergency " Department if symptoms fail to improve, for any change in condition, further concerns, or new concerning symptoms. Patient states understanding of the plan of care and discharge instructions.    Meli Izaguirre M.D.

## 2024-01-22 ENCOUNTER — OFFICE VISIT (OUTPATIENT)
Dept: URGENT CARE | Facility: CLINIC | Age: 54
End: 2024-01-22
Payer: COMMERCIAL

## 2024-01-22 VITALS
OXYGEN SATURATION: 98 % | HEART RATE: 85 BPM | BODY MASS INDEX: 26.66 KG/M2 | HEIGHT: 69 IN | SYSTOLIC BLOOD PRESSURE: 122 MMHG | TEMPERATURE: 98 F | WEIGHT: 180 LBS | DIASTOLIC BLOOD PRESSURE: 80 MMHG | RESPIRATION RATE: 20 BRPM

## 2024-01-22 DIAGNOSIS — R05.1 ACUTE COUGH: ICD-10-CM

## 2024-01-22 DIAGNOSIS — B34.9 VIRAL ILLNESS: ICD-10-CM

## 2024-01-22 PROCEDURE — 3074F SYST BP LT 130 MM HG: CPT | Performed by: FAMILY MEDICINE

## 2024-01-22 PROCEDURE — 3079F DIAST BP 80-89 MM HG: CPT | Performed by: FAMILY MEDICINE

## 2024-01-22 PROCEDURE — 99213 OFFICE O/P EST LOW 20 MIN: CPT | Performed by: FAMILY MEDICINE

## 2024-01-22 RX ORDER — BENZONATATE 200 MG/1
200 CAPSULE ORAL 3 TIMES DAILY PRN
Qty: 20 CAPSULE | Refills: 0 | Status: SHIPPED | OUTPATIENT
Start: 2024-01-22 | End: 2024-02-05

## 2024-01-22 RX ORDER — METHYLPREDNISOLONE 4 MG/1
TABLET ORAL
Qty: 21 TABLET | Refills: 0 | Status: SHIPPED | OUTPATIENT
Start: 2024-01-22 | End: 2024-02-05

## 2024-01-22 NOTE — PROGRESS NOTES
"  Subjective:      53 y.o. male presents to urgent care for col symptoms that started on Friday. He is experiencing headache, body ache, sore throat, and cough.  No fever or diarrhea.  No tobacco product use.  No history of asthma or COPD.  He is vaccinated against COVID.  He had a negative home COVID test already.  No known sick contacts.    He denies any other questions or concerns at this time.    Current problem list, medication, and past medical/surgical history were reviewed in Epic.    ROS  See HPI     Objective:      /80   Pulse 85   Temp 36.7 °C (98 °F) (Temporal)   Resp 20   Ht 1.753 m (5' 9\")   Wt 81.6 kg (180 lb)   SpO2 98%   BMI 26.58 kg/m²     Physical Exam  Constitutional:       General: He is not in acute distress.     Appearance: He is not diaphoretic.   HENT:      Right Ear: Tympanic membrane, ear canal and external ear normal.      Left Ear: Tympanic membrane, ear canal and external ear normal.      Mouth/Throat:      Tongue: Tongue does not deviate from midline.      Palate: No lesions.      Pharynx: Uvula midline. No posterior oropharyngeal erythema.      Tonsils: No tonsillar exudate. 1+ on the right. 1+ on the left.   Cardiovascular:      Rate and Rhythm: Normal rate and regular rhythm.      Heart sounds: Normal heart sounds.   Pulmonary:      Effort: Pulmonary effort is normal. No respiratory distress.      Breath sounds: Normal breath sounds.   Neurological:      Mental Status: He is alert.   Psychiatric:         Mood and Affect: Affect normal.         Judgment: Judgment normal.       Assessment/Plan:     1. Viral illness  2. Acute cough  Symptoms most consistent with virus.  Out of the window for treatment with Tamiflu, already had a negative home COVID test.  Have opted to forego further viral panel testing. Symptomatic treatment with Tylenol, Medrol Dosepak, and Tessalon Perles.  - methylPREDNISolone (MEDROL DOSEPAK) 4 MG Tablet Therapy Pack; Follow schedule on package " instructions.  Dispense: 21 Tablet; Refill: 0  - benzonatate (TESSALON) 200 MG capsule; Take 1 Capsule by mouth 3 times a day as needed for Cough.  Dispense: 20 Capsule; Refill: 0      Instructed to return to Urgent Care or nearest Emergency Department if symptoms fail to improve, for any change in condition, further concerns, or new concerning symptoms. Patient states understanding of the plan of care and discharge instructions.    Meli Izaguirre M.D.

## 2024-02-05 ENCOUNTER — APPOINTMENT (OUTPATIENT)
Dept: URGENT CARE | Facility: CLINIC | Age: 54
End: 2024-02-05
Payer: COMMERCIAL

## 2024-02-05 ENCOUNTER — OFFICE VISIT (OUTPATIENT)
Dept: URGENT CARE | Facility: CLINIC | Age: 54
End: 2024-02-05
Payer: COMMERCIAL

## 2024-02-05 VITALS
HEART RATE: 90 BPM | SYSTOLIC BLOOD PRESSURE: 144 MMHG | WEIGHT: 180 LBS | BODY MASS INDEX: 26.66 KG/M2 | OXYGEN SATURATION: 97 % | DIASTOLIC BLOOD PRESSURE: 86 MMHG | RESPIRATION RATE: 16 BRPM | HEIGHT: 69 IN | TEMPERATURE: 98.8 F

## 2024-02-05 DIAGNOSIS — Z03.818 ENCOUNTER FOR PATIENT CONCERN ABOUT EXPOSURE TO INFECTIOUS ORGANISM: ICD-10-CM

## 2024-02-05 DIAGNOSIS — R03.0 ELEVATED BLOOD PRESSURE READING: ICD-10-CM

## 2024-02-05 LAB
FLUAV RNA SPEC QL NAA+PROBE: NEGATIVE
FLUBV RNA SPEC QL NAA+PROBE: NEGATIVE
RSV RNA SPEC QL NAA+PROBE: NEGATIVE
SARS-COV-2 RNA RESP QL NAA+PROBE: NEGATIVE

## 2024-02-05 PROCEDURE — 3079F DIAST BP 80-89 MM HG: CPT | Performed by: FAMILY MEDICINE

## 2024-02-05 PROCEDURE — 3077F SYST BP >= 140 MM HG: CPT | Performed by: FAMILY MEDICINE

## 2024-02-05 PROCEDURE — 0241U POCT CEPHEID COV-2, FLU A/B, RSV - PCR: CPT | Performed by: FAMILY MEDICINE

## 2024-02-05 PROCEDURE — 99214 OFFICE O/P EST MOD 30 MIN: CPT | Performed by: FAMILY MEDICINE

## 2024-02-06 NOTE — PROGRESS NOTES
"  Subjective:      53 y.o. male presents to urgent care for cold symptoms that started Saturday. He is experiencing subjective fever, headache, body aches, and cough. No sore throat or diarrhea. No tobacco product use.  No history of asthma or COPD.  He is vaccinated against COVID.  No known sick contacts.    Blood pressure is elevated today in urgent care.  He does not to have a history of hypertension.  He denies any chest pain, palpitations, or shortness of breath.    He denies any other questions or concerns at this time.    Current problem list, medication, and past medical/surgical history were reviewed in Epic.    ROS  See HPI     Objective:      BP (!) 144/86 (BP Location: Left arm, Patient Position: Sitting, BP Cuff Size: Large adult)   Pulse 90   Temp 37.1 °C (98.8 °F)   Resp 16   Ht 1.753 m (5' 9\")   Wt 81.6 kg (180 lb)   SpO2 97%   BMI 26.58 kg/m²     Physical Exam  Constitutional:       General: He is not in acute distress.     Appearance: He is not diaphoretic.   HENT:      Right Ear: Tympanic membrane, ear canal and external ear normal.      Left Ear: Tympanic membrane, ear canal and external ear normal.      Mouth/Throat:      Tongue: Tongue does not deviate from midline.      Palate: No lesions.      Pharynx: Uvula midline. No oropharyngeal exudate or posterior oropharyngeal erythema.      Tonsils: No tonsillar exudate.   Cardiovascular:      Rate and Rhythm: Normal rate and regular rhythm.      Heart sounds: Normal heart sounds.   Pulmonary:      Effort: Pulmonary effort is normal. No respiratory distress.      Breath sounds: Normal breath sounds.   Neurological:      Mental Status: He is alert.   Psychiatric:         Mood and Affect: Affect normal.         Judgment: Judgment normal.       Assessment/Plan:     1. Encounter for patient concern about exposure to infectious organism  2. Elevated blood pressure reading  Systemic symptoms seen through elevated blood pressure.  This is " asymptomatic.  Negative COVID, influenza, and RSV.  Symptoms are most consistent with virus.  Tylenol, ibuprofen, rest, and hydration as needed for symptomatic relief.  - POCT CEPHEID COV-2, FLU A/B, RSV - PCR      Instructed to return to Urgent Care or nearest Emergency Department if symptoms fail to improve, for any change in condition, further concerns, or new concerning symptoms. Patient states understanding of the plan of care and discharge instructions.    eMli Izaguirre M.D.

## 2024-09-07 ENCOUNTER — OFFICE VISIT (OUTPATIENT)
Dept: URGENT CARE | Facility: CLINIC | Age: 54
End: 2024-09-07
Payer: COMMERCIAL

## 2024-09-07 VITALS
WEIGHT: 180 LBS | OXYGEN SATURATION: 97 % | TEMPERATURE: 98.1 F | HEART RATE: 73 BPM | DIASTOLIC BLOOD PRESSURE: 72 MMHG | BODY MASS INDEX: 26.66 KG/M2 | RESPIRATION RATE: 18 BRPM | SYSTOLIC BLOOD PRESSURE: 124 MMHG | HEIGHT: 69 IN

## 2024-09-07 DIAGNOSIS — B30.9 VIRAL CONJUNCTIVITIS, BOTH EYES: ICD-10-CM

## 2024-09-07 DIAGNOSIS — J02.9 PHARYNGITIS, UNSPECIFIED ETIOLOGY: ICD-10-CM

## 2024-09-07 DIAGNOSIS — H61.23 BILATERAL IMPACTED CERUMEN: ICD-10-CM

## 2024-09-07 DIAGNOSIS — J06.9 VIRAL URI: Primary | ICD-10-CM

## 2024-09-07 DIAGNOSIS — H92.03 OTALGIA OF BOTH EARS: ICD-10-CM

## 2024-09-07 PROCEDURE — 69210 REMOVE IMPACTED EAR WAX UNI: CPT | Mod: 50

## 2024-09-07 PROCEDURE — 3078F DIAST BP <80 MM HG: CPT

## 2024-09-07 PROCEDURE — 3074F SYST BP LT 130 MM HG: CPT

## 2024-09-07 PROCEDURE — 99213 OFFICE O/P EST LOW 20 MIN: CPT | Mod: 25

## 2024-09-07 RX ORDER — OLOPATADINE HYDROCHLORIDE 1 MG/ML
1 SOLUTION/ DROPS OPHTHALMIC 2 TIMES DAILY
Qty: 5 ML | Refills: 0 | Status: SHIPPED | OUTPATIENT
Start: 2024-09-07 | End: 2024-09-14

## 2024-09-07 NOTE — PROGRESS NOTES
Subjective:   Darrell Latham is a 54 y.o. male who presents for Congestion (X2 days, sore throat, loss of voice, chest congestion and redness of eyes )          I introduced myself to the patient and informed them that I am a Family Nurse Practitioner.    HPI:Vinicio is a 54 year-old male who comes in today c/o fever, chills, cough, nasal congestion, runny nose, malaise, body aches, pharyngitis, redness and irritation of eyes with clear drainage.  Onset was 2 days ago. Patient describes symptoms as constant. They describe the pain as headache, body aches,  sharp and scratchy throat. Aggravating factors include worse at night, cough is worse when they lay down, throat pain is exacerbated by eating, drinking, swallowing. Relieving factors include none. Treatments tried at home include none. They describe their symptoms as moderate. Denies any known exposure to Covid, Flu, RSV, strep. Denies anyone else is sick at home presently. States they did not get a flu shot this season, states vaccinated against Covid x 7.      Review of Systems   Constitutional:  Positive for chills and malaise/fatigue. Negative for diaphoresis and fever.   HENT:  Positive for congestion and sore throat. Negative for ear discharge, ear pain, hearing loss and tinnitus.    Eyes:  Negative for blurred vision, double vision, photophobia, pain, discharge and redness.   Respiratory:  Positive for cough and sputum production. Negative for hemoptysis, shortness of breath, wheezing and stridor.    Cardiovascular:  Negative for chest pain, palpitations and leg swelling.   Gastrointestinal:  Negative for abdominal pain, diarrhea, heartburn, nausea and vomiting.   Genitourinary:  Negative for dysuria.   Musculoskeletal:  Positive for myalgias.   Skin:  Negative for rash.   Neurological:  Negative for dizziness, focal weakness and headaches.   Endo/Heme/Allergies:  Does not bruise/bleed easily.   Psychiatric/Behavioral:  Negative for depression.    All  "other systems reviewed and are negative.      Medications: ALPRAZolam Tabs  buPROPion Tabs  Dovato Tabs  PANTOPRAZOLE SODIUM PO  temazepam  testosterone cypionate     Allergies: Latex and Seasonal    Problem List: does not have any pertinent problems on file.    Surgical History:  Past Surgical History:   Procedure Laterality Date    FERNANDO BY LAPAROSCOPY  2/28/2018    Procedure: FERNANDO BY LAPAROSCOPY;  Surgeon: Bernadine James M.D.;  Location: SURGERY [facility];  Service: General       Past Social Hx:   reports that he has never smoked. He has never used smokeless tobacco. He reports current alcohol use of about 2.4 oz of alcohol per week. He reports that he does not use drugs.     Past Family Hx:   family history includes Alzheimer's Disease in his maternal uncle and paternal grandmother; Cancer in his maternal aunt; GI Disease in his father.     Problem list, medications, and allergies reviewed by myself today in Epic.   I have documented what I find to be significant in regards to past medical, social, family and surgical history  in my HPI or under PMH/PSH/FH review section, otherwise it is noncontributory     Objective:     /72   Pulse 73   Temp 36.7 °C (98.1 °F) (Temporal)   Resp 18   Ht 1.753 m (5' 9\")   Wt 81.6 kg (180 lb)   SpO2 97%   BMI 26.58 kg/m²     During this visit, appropriate PPE was worn, and hand hygiene was performed.    Physical Exam  Vitals reviewed.   Constitutional:       General: He is not in acute distress.     Appearance: Normal appearance. He is not toxic-appearing or diaphoretic.   HENT:      Head: Normocephalic and atraumatic.      Right Ear: Tympanic membrane, ear canal and external ear normal. There is no impacted cerumen.      Left Ear: Tympanic membrane, ear canal and external ear normal. There is no impacted cerumen.      Nose: Congestion and rhinorrhea present.      Mouth/Throat:      Mouth: Mucous membranes are moist.      Pharynx: Posterior oropharyngeal " erythema present. No oropharyngeal exudate.      Comments: No tonsillar swelling, bilaterally.There is posterior oropharyngeal erythema present, no exudates or cobblestoning.   No soft tissue swelling of the sublingual mucosa, no petechia or swelling of the soft or hard palate, no unilarteral oropharynx swelling, no sign of tonsillar stone, epiglottitis, or abscess.  Airway is patent and there is no stridor.  Patient is managing oral secretions appropriately.  Uvula is midline and appropriate size with no erythema or edema.    Eyes:      General: No scleral icterus.        Right eye: Discharge present.         Left eye: Discharge present.     Extraocular Movements: Extraocular movements intact.      Pupils: Pupils are equal, round, and reactive to light.      Comments: Bilateral eye exam shows no penetrative injury or foreign object noted.  There is mild injection  of the conjunctivae, no signs of thick yellow mucopurulent drainage or crusting of the eyelashes present.  No signs of uveitis, hyphema, proptosis, or chemosis.  EOM intact without pain, no periorbital swelling or cellulitis.  Visual acuity is grossly intact. CN II - IV and CN VI grossly intact by visual exam.       Cardiovascular:      Rate and Rhythm: Normal rate and regular rhythm.      Heart sounds: Normal heart sounds. No murmur heard.     No friction rub. No gallop.   Pulmonary:      Effort: No respiratory distress.      Breath sounds: Normal breath sounds. No stridor. No wheezing, rhonchi or rales.   Chest:      Chest wall: No tenderness.   Abdominal:      General: Bowel sounds are normal. There is no distension.      Palpations: Abdomen is soft.   Genitourinary:     Comments: Deferred  Musculoskeletal:         General: Normal range of motion.      Cervical back: Normal range of motion. No rigidity or tenderness.   Lymphadenopathy:      Cervical: No cervical adenopathy.   Skin:     General: Skin is warm and dry.      Capillary Refill: Capillary  refill takes 2 to 3 seconds.      Coloration: Skin is not jaundiced or pale.   Neurological:      General: No focal deficit present.      Mental Status: He is alert and oriented to person, place, and time. Mental status is at baseline.   Psychiatric:         Mood and Affect: Mood normal.         Behavior: Behavior normal.         Thought Content: Thought content normal.         Judgment: Judgment normal.       Lab Results/POC Test Results   Results for orders placed or performed in visit on 02/05/24   POCT CEPHEID COV-2, FLU A/B, RSV - PCR   Result Value Ref Range    SARS-CoV-2 by PCR Negative Negative, Invalid    Influenza virus A RNA Negative Negative, Invalid    Influenza virus B, PCR Negative Negative, Invalid    RSV, PCR Negative Negative, Invalid         Procedure - cerumen removal  After explaining procedure to patient and getting the verbal consent, I did attempt to remove cerumen from both ears with a lighted curette with fair result and removal of boluses of cerumen from bilateral ears.  Patient was able to tolerated fairly well at first but then complained of irritation and sensitivity.  Otoscopic exam revealed there was still a significant amount of cerumen present in bilateral ear canals.  Ear lavage was performed after instilling a softening agent.  This achieved good result, otoscopic exam following lavage revealed that bilateral ear canals were clear of cerumen, canals and TMs were normal with good landmarks and no signs of infection.     Assessment/Plan:     Diagnosis and associated orders:     1. Viral URI        2. Viral conjunctivitis, both eyes  olopatadine (PATANOL) 0.1 % ophthalmic solution    benzocaine-menthol (CEPACOL EXTRA STRENGTH) 15-2.6 MG Lozenge lozenge      3. Pharyngitis, unspecified etiology  benzocaine-menthol (CEPACOL EXTRA STRENGTH) 15-2.6 MG Lozenge lozenge      4. Bilateral impacted cerumen        5. Otalgia of both ears           Comments/MDM:     1. Viral URI    I discussed  with patient I will call them only if PCR testing in clinic today is positive and we need to discuss further treatment otherwise treatment will be the supportive care measures we discussed in clinic today.  Results will be available on [facility] if they have this set up.  They state they understand and are agreeable.  We discussed viral versus bacterial infection, and I informed patient that they have a self-limiting viral URI at this time and antibiotics are not an effective treatment for this.    I instructed them that the management for this is supportive care-we discussed cough/deep breathing exercises, drink plenty of fluids, get plenty of rest, try a humidifier in bedroom at night to help keep mucous membranes moist during sleep, gargle with salt water/honey/OTC Cepacol lozenges to help ease sore throat.    I instr patient they may use OTC cold and flu meds to treat symptoms  (caution regarding checking ingredients as some meds contain tylenol); may use tylenol alternated with ibuprofen (if not allergic or contraindicated) for pain/fever - may take tylenol 1000mg every 6 hours, do not exceed 3000 mg in 24 hours; ibuprofen (if not contraindicated) start with lowest effective dose, 200mg every 8 hours, may increase to up to 400 mg every 8 hours if needed, take with food.  Doses in excess of 400 mg have not being shown to increase therapeutic effect however do increase propensity for side effects/complications.  Patient was instructed that they should feel better in 7-10 days, (but some viral illnesses can last 2 weeks or longer, with residual cough possible for over a month) but to return to clinic if symptoms do not improve or worsen after 10-14 days.   We did discuss red flags and when to return to urgent care versus when to go to the emergency room and strict ER precautions were given.    I did print written instructions for patient, and did go over the diagnosis including differentials, and side effects of  each medication with them and answer their questions to the best of my ability.   Advised the patient to follow-up with the primary care physician for recheck, reevaluation, and consideration of further management.  Strict ER precautions discussed for any  chest pain, difficulty breathing, difficulty swallowing, wheezing, stridor, or drooling, fever that does not respond to ibuprofen and Tylenol, inability to tolerate fluids, dehydration, lethargy.    Patient states they have good understanding and they are agreeable with the plan of care.      2. Viral conjunctivitis, both eyes  - olopatadine (PATANOL) 0.1 % ophthalmic solution; Administer 1 Drop into both eyes 2 times a day for 7 days.  Dispense: 5 mL; Refill: 0  - benzocaine-menthol (CEPACOL EXTRA STRENGTH) 15-2.6 MG Lozenge lozenge; Dissolve 1 Lozenge in the mouth every 2 hours as needed (sore throat).  Dispense: 18 Lozenge; Refill: 0    3. Pharyngitis, unspecified etiology  - benzocaine-menthol (CEPACOL EXTRA STRENGTH) 15-2.6 MG Lozenge lozenge; Dissolve 1 Lozenge in the mouth every 2 hours as needed (sore throat).  Dispense: 18 Lozenge; Refill: 0    4. Bilateral impacted cerumen  Cerumen removal per procedure note above    5. Otalgia of both ears           Pt is clinically stable at today's acute urgent care visit. Vital signs are normal and reassuring.  No acute distress noted. Appropriate for outpatient management at this time.        I personally reviewed prior external notes and test results pertinent to today's visit.  I have independently reviewed and interpreted all diagnostics ordered during this urgent care acute visit.        Please note that this dictation was created using voice recognition software. I have made a reasonable attempt to correct obvious errors, but I expect that there are errors of grammar and possibly content that I did not discover before finalizing the note.    This note was electronically signed by ISABELL Marinelli,  CWS, CFCN

## 2024-09-12 ASSESSMENT — ENCOUNTER SYMPTOMS
FOCAL WEAKNESS: 0
DIAPHORESIS: 0
DIARRHEA: 0
BRUISES/BLEEDS EASILY: 0
EYE DISCHARGE: 0
SHORTNESS OF BREATH: 0
DIZZINESS: 0
PALPITATIONS: 0
ABDOMINAL PAIN: 0
CHILLS: 1
SORE THROAT: 1
DEPRESSION: 0
NAUSEA: 0
EYE REDNESS: 0
WHEEZING: 0
BLURRED VISION: 0
SPUTUM PRODUCTION: 1
PHOTOPHOBIA: 0
HEMOPTYSIS: 0
HEADACHES: 0
STRIDOR: 0
FEVER: 0
EYE PAIN: 0
COUGH: 1
DOUBLE VISION: 0
MYALGIAS: 1
HEARTBURN: 0
VOMITING: 0

## 2025-01-03 ENCOUNTER — APPOINTMENT (OUTPATIENT)
Dept: RADIOLOGY | Facility: IMAGING CENTER | Age: 55
End: 2025-01-03
Attending: REGISTERED NURSE
Payer: COMMERCIAL

## 2025-01-03 ENCOUNTER — OFFICE VISIT (OUTPATIENT)
Dept: URGENT CARE | Facility: CLINIC | Age: 55
End: 2025-01-03
Payer: COMMERCIAL

## 2025-01-03 VITALS
OXYGEN SATURATION: 96 % | RESPIRATION RATE: 18 BRPM | DIASTOLIC BLOOD PRESSURE: 70 MMHG | BODY MASS INDEX: 26.07 KG/M2 | HEART RATE: 68 BPM | WEIGHT: 176 LBS | SYSTOLIC BLOOD PRESSURE: 128 MMHG | HEIGHT: 69 IN | TEMPERATURE: 97.9 F

## 2025-01-03 DIAGNOSIS — Z21 HIV INFECTION, UNSPECIFIED SYMPTOM STATUS (HCC): ICD-10-CM

## 2025-01-03 DIAGNOSIS — R05.1 ACUTE COUGH: ICD-10-CM

## 2025-01-03 DIAGNOSIS — J06.9 VIRAL URI WITH COUGH: ICD-10-CM

## 2025-01-03 PROBLEM — B20 HUMAN IMMUNODEFICIENCY VIRUS (HIV) DISEASE (HCC): Status: ACTIVE | Noted: 2025-01-03

## 2025-01-03 PROCEDURE — 3078F DIAST BP <80 MM HG: CPT | Performed by: REGISTERED NURSE

## 2025-01-03 PROCEDURE — 71046 X-RAY EXAM CHEST 2 VIEWS: CPT | Mod: TC,FY | Performed by: REGISTERED NURSE

## 2025-01-03 PROCEDURE — 3074F SYST BP LT 130 MM HG: CPT | Performed by: REGISTERED NURSE

## 2025-01-03 PROCEDURE — 99214 OFFICE O/P EST MOD 30 MIN: CPT | Performed by: REGISTERED NURSE

## 2025-01-03 RX ORDER — TADALAFIL 20 MG/1
20 TABLET ORAL
COMMUNITY

## 2025-01-03 RX ORDER — DEXTROMETHORPHAN HYDROBROMIDE AND PROMETHAZINE HYDROCHLORIDE 15; 6.25 MG/5ML; MG/5ML
5 SYRUP ORAL EVERY 4 HOURS PRN
Qty: 120 ML | Refills: 0 | Status: SHIPPED | OUTPATIENT
Start: 2025-01-03

## 2025-01-03 ASSESSMENT — ENCOUNTER SYMPTOMS
SPUTUM PRODUCTION: 1
CHILLS: 0
SHORTNESS OF BREATH: 0
SORE THROAT: 1
COUGH: 1
DIZZINESS: 0
ABDOMINAL PAIN: 0
FEVER: 0

## 2025-01-03 NOTE — PROGRESS NOTES
Subjective:   Darrell Latham is a 54 y.o. male who presents for Cough (X 6 days, slowly getting sick/worsening, cough, concerned about Pneumonia, coughed blood, did a Home Covid test: Negative, congestion, throat feels raw, voice was gone: improving today, headache, worsening symptoms )    HPI  X 6 days of cold symptoms, today is somewhat better than yesterday. Cough is persistent and productive at times. No chronic lung disease. 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      Review of Systems   Constitutional:  Positive for malaise/fatigue. Negative for chills and fever.   HENT:  Positive for congestion and sore throat.    Respiratory:  Positive for cough and sputum production. Negative for shortness of breath.    Cardiovascular:  Negative for chest pain.   Gastrointestinal:  Negative for abdominal pain.   Skin:  Negative for rash.   Neurological:  Negative for dizziness.       Allergies   Allergen Reactions    Latex      rash    Seasonal Itching     Sinus pressure, itchy eyes, itchy throat       Patient Active Problem List    Diagnosis Date Noted    Non-smoker 04/29/2019    BUTCH (obstructive sleep apnea) 02/28/2019    Chronic fatigue 02/26/2018    Vertigo 02/26/2018    Hyponatremia 07/19/2016    Chest pain 07/19/2016       Current Outpatient Medications Ordered in Epic   Medication Sig Dispense Refill    Pseudoephedrine-APAP-DM (DAYQUIL PO) Take  by mouth.      tadalafil 20 MG tablet Take 20 mg by mouth 1 time a day as needed.      promethazine-dextromethorphan (PROMETHAZINE-DM) 6.25-15 MG/5ML syrup Take 5 mL by mouth every four hours as needed for Cough. 120 mL 0    temazepam (RESTORIL) 30 MG capsule Take 30 mg by mouth at bedtime.      DOVATO  MG Tab Take 1 Tablet by mouth every day.      buPROPion (WELLBUTRIN) 100 MG Tab Take 1 Tablet by mouth every day.      ALPRAZolam (XANAX) 1 MG Tab TAKE 1 TABLET BY MOUTH EVERY NIGHT AT  "BEDTIME F51.01, 30 DAYS  2    PANTOPRAZOLE SODIUM PO Take  by mouth every day.      testosterone cypionate (DEPO-TESTOSTERONE) 200 MG/ML Solution injection Inject 0.25 mL into the shoulder, thigh, or buttocks one time.      benzocaine-menthol (CEPACOL EXTRA STRENGTH) 15-2.6 MG Lozenge lozenge Dissolve 1 Lozenge in the mouth every 2 hours as needed (sore throat). (Patient not taking: Reported on 1/3/2025) 18 Lozenge 0     No current Epic-ordered facility-administered medications on file.       Past Surgical History:   Procedure Laterality Date    FERNANDO BY LAPAROSCOPY  2/28/2018    Procedure: FERNANDO BY LAPAROSCOPY;  Surgeon: Bernadine James M.D.;  Location: SURGERY [facility];  Service: General       Social History     Tobacco Use    Smoking status: Never    Smokeless tobacco: Never   Vaping Use    Vaping status: Never Used   Substance Use Topics    Alcohol use: Not Currently     Alcohol/week: 2.4 oz     Types: 4 Standard drinks or equivalent per week     Comment: few times a week     Drug use: No       family history includes Alzheimer's Disease in his maternal uncle and paternal grandmother; Cancer in his maternal aunt; GI Disease in his father.     Problem list, medications, and allergies reviewed by myself today in Epic.     Objective:   /70 (BP Location: Left arm, Patient Position: Sitting, BP Cuff Size: Adult)   Pulse 68   Temp 36.6 °C (97.9 °F) (Temporal)   Resp 18   Ht 1.753 m (5' 9\")   Wt 79.8 kg (176 lb)   SpO2 96%   BMI 25.99 kg/m²     Physical Exam  Vitals and nursing note reviewed.   Constitutional:       General: He is not in acute distress.     Appearance: Normal appearance. He is well-developed. He is not ill-appearing, toxic-appearing or diaphoretic.   HENT:      Head: Normocephalic and atraumatic.      Right Ear: Tympanic membrane, ear canal and external ear normal.      Left Ear: Tympanic membrane, ear canal and external ear normal.      Nose: Congestion and rhinorrhea present. "      Mouth/Throat:      Mouth: Mucous membranes are moist.      Pharynx: Oropharynx is clear. Uvula midline. Postnasal drip present. No oropharyngeal exudate or posterior oropharyngeal erythema.      Comments: No signs of obstruction or abscess  Eyes:      General:         Right eye: No discharge.         Left eye: No discharge.      Conjunctiva/sclera: Conjunctivae normal.   Cardiovascular:      Rate and Rhythm: Normal rate and regular rhythm.      Pulses: Normal pulses.      Heart sounds: Normal heart sounds. No murmur heard.  Pulmonary:      Effort: Pulmonary effort is normal. No respiratory distress.      Breath sounds: Normal breath sounds. No wheezing, rhonchi or rales.   Chest:      Chest wall: No tenderness.   Musculoskeletal:         General: No swelling or tenderness.      Cervical back: Normal range of motion and neck supple.      Right lower leg: No edema.      Left lower leg: No edema.   Lymphadenopathy:      Cervical: No cervical adenopathy.   Skin:     General: Skin is warm and dry.   Neurological:      General: No focal deficit present.      Mental Status: He is alert and oriented to person, place, and time. Mental status is at baseline.   Psychiatric:         Mood and Affect: Mood normal.         Behavior: Behavior normal.         Thought Content: Thought content normal.         Judgment: Judgment normal.         RADIOLOGY RESULTS   DX-CHEST-2 VIEWS    Result Date: 1/3/2025  1/3/2025 12:08 PM HISTORY/REASON FOR EXAM:  Cough and congestion for one week. TECHNIQUE/EXAM DESCRIPTION AND NUMBER OF VIEWS: Two views of the chest. COMPARISON:  8/22/2017 FINDINGS: The mediastinal and cardiac silhouette is unremarkable. The pulmonary vascularity is within normal limits. The lung parenchyma is clear. There is no significant pleural effusion. There is no visible pneumothorax. There are no acute bony abnormalities.     1.  Unremarkable two view chest.           Assessment/Plan:     I personally reviewed prior  external notes and test results pertinent to today's visit as well as additional imaging and testing completed in clinic today.    I introduced myself as Tam Lopez a Nurse Practitioner.    1. Viral URI with cough        2. Acute cough  promethazine-dextromethorphan (PROMETHAZINE-DM) 6.25-15 MG/5ML syrup    DX-CHEST-2 VIEWS      3. HIV infection, unspecified symptom status (HCC)          TESTING: Chest x-ray negative for acute cardiopulmonary process  VITAL SIGNS: WNL  MDM/PLAN: No signs of distress.  Talking full sentences.  Does have congestion, rhinorrhea and postnasal drainage.  No adventitious lung sounds.  No rash or nuchal rigidity.  Overall, unremarkable exam w/o red flag signs or symptoms.  We did discuss likely viral etiology.  No evidence of bacterial infection at this time.    Promethazine DM for cough, precautions given  OTC cold medication  Pulmonary toilet  Recommend adequate hydration   Rest  Return precautions discussed    Medication discussed included indication for use and the potential benefits and side effects. The Patient was encouraged to monitor symptoms closely, and we reviewed the signs and symptoms that require a higher level of care through the emergency department. Patient verbalized understanding.    Please note that this dictation was created using voice recognition software. I have made every reasonable attempt to correct obvious errors, but I expect that there are errors of grammar and possibly content that I did not discover before finalizing the note.    This note was electronically signed by YISSEL Young

## 2025-01-21 ENCOUNTER — OFFICE VISIT (OUTPATIENT)
Dept: URGENT CARE | Facility: CLINIC | Age: 55
End: 2025-01-21
Payer: COMMERCIAL

## 2025-01-21 VITALS
HEIGHT: 69 IN | HEART RATE: 92 BPM | TEMPERATURE: 97.9 F | RESPIRATION RATE: 16 BRPM | BODY MASS INDEX: 26.07 KG/M2 | OXYGEN SATURATION: 95 % | DIASTOLIC BLOOD PRESSURE: 80 MMHG | SYSTOLIC BLOOD PRESSURE: 118 MMHG | WEIGHT: 176 LBS

## 2025-01-21 DIAGNOSIS — J22 LOWER RESPIRATORY INFECTION (E.G., BRONCHITIS, PNEUMONIA, PNEUMONITIS, PULMONITIS): ICD-10-CM

## 2025-01-21 DIAGNOSIS — K12.2 UVULITIS: ICD-10-CM

## 2025-01-21 PROCEDURE — 3074F SYST BP LT 130 MM HG: CPT | Performed by: NURSE PRACTITIONER

## 2025-01-21 PROCEDURE — 99213 OFFICE O/P EST LOW 20 MIN: CPT | Performed by: NURSE PRACTITIONER

## 2025-01-21 PROCEDURE — 3079F DIAST BP 80-89 MM HG: CPT | Performed by: NURSE PRACTITIONER

## 2025-01-21 RX ORDER — DEXTROMETHORPHAN HYDROBROMIDE AND PROMETHAZINE HYDROCHLORIDE 15; 6.25 MG/5ML; MG/5ML
5 SYRUP ORAL EVERY 6 HOURS PRN
Qty: 120 ML | Refills: 0 | Status: SHIPPED | OUTPATIENT
Start: 2025-01-21 | End: 2025-01-28

## 2025-01-21 RX ORDER — ALBUTEROL SULFATE 90 UG/1
1-2 INHALANT RESPIRATORY (INHALATION) EVERY 4 HOURS PRN
Qty: 8.5 G | Refills: 0 | Status: SHIPPED | OUTPATIENT
Start: 2025-01-21

## 2025-01-21 RX ORDER — METHYLPREDNISOLONE 4 MG/1
TABLET ORAL
Qty: 21 TABLET | Refills: 0 | Status: SHIPPED | OUTPATIENT
Start: 2025-01-21

## 2025-01-21 RX ORDER — DOXYCYCLINE HYCLATE 100 MG
100 TABLET ORAL 2 TIMES DAILY
Qty: 14 TABLET | Refills: 0 | Status: SHIPPED | OUTPATIENT
Start: 2025-01-21 | End: 2025-01-28

## 2025-01-21 ASSESSMENT — ENCOUNTER SYMPTOMS
SHORTNESS OF BREATH: 1
SPUTUM PRODUCTION: 0
WHEEZING: 1
COUGH: 1
HEMOPTYSIS: 0
HEARTBURN: 1
VOMITING: 0
DIARRHEA: 1
SORE THROAT: 0

## 2025-01-21 NOTE — PATIENT INSTRUCTIONS

## 2025-01-21 NOTE — PROGRESS NOTES
"  Subjective:     Darrell Latham is a 54 y.o. male who presents for Nasal Congestion (Loss of voice,  chest burning and has a lot of belching has hx of GERD)      Hoarse voice x 3 weeks. Took Mucinex. Cough worsened last night, with a \"thick, sour, mucus\". Feverish in the beginning. Takes a PPI. Hx of endoscopy. Statres he tried a striong Whiskey 2 days ago to see it that would help, and it felt like it burnt his throat.    Cough  The current episode started 1 to 4 weeks ago. Associated symptoms include heartburn, nasal congestion, shortness of breath and wheezing. Pertinent negatives include no hemoptysis or sore throat.       Past Medical History:   Diagnosis Date    Bowel habit changes     constipation and diarrhea    Chickenpox     Heart burn     HIV (human immunodeficiency virus infection) (HCC)     Diagnosed in [place], has never been on ART or had AIDS defining illness.    Low testosterone in male        Past Surgical History:   Procedure Laterality Date    FERNANDO BY LAPAROSCOPY  2/28/2018    Procedure: FERNANDO BY LAPAROSCOPY;  Surgeon: Bernadine James M.D.;  Location: SURGERY [facility];  Service: General       Social History     Socioeconomic History    Marital status: Single     Spouse name: Not on file    Number of children: Not on file    Years of education: Not on file    Highest education level: Not on file   Occupational History    Not on file   Tobacco Use    Smoking status: Never    Smokeless tobacco: Never   Vaping Use    Vaping status: Never Used   Substance and Sexual Activity    Alcohol use: Not Currently     Alcohol/week: 2.4 oz     Types: 4 Standard drinks or equivalent per week     Comment: few times a week     Drug use: No    Sexual activity: Not Currently   Other Topics Concern    Not on file   Social History Narrative    Not on file     Social Drivers of Health     Financial Resource Strain: Not on file   Food Insecurity: Not on file   Transportation Needs: Not on file " "  Physical Activity: Not on file   Stress: Not on file   Social Connections: Not on file   Intimate Partner Violence: Not on file   Housing Stability: Not on file        Family History   Problem Relation Age of Onset    Alzheimer's Disease Maternal Uncle     Alzheimer's Disease Paternal Grandmother     GI Disease Father         CIrrhosis    Cancer Maternal Aunt         Kidney    Heart Disease Neg Hx         Allergies   Allergen Reactions    Latex      rash    Seasonal Itching     Sinus pressure, itchy eyes, itchy throat       Review of Systems   Constitutional:  Positive for malaise/fatigue.   HENT:  Positive for congestion. Negative for sore throat.    Respiratory:  Positive for cough, shortness of breath and wheezing. Negative for hemoptysis and sputum production.    Gastrointestinal:  Positive for diarrhea and heartburn. Negative for vomiting.   All other systems reviewed and are negative.       Objective:   /80 (BP Location: Left arm, Patient Position: Sitting, BP Cuff Size: Small adult)   Pulse 92   Temp 36.6 °C (97.9 °F) (Temporal)   Resp 16   Ht 1.753 m (5' 9\")   Wt 79.8 kg (176 lb)   SpO2 95%   BMI 25.99 kg/m²     Physical Exam  Vitals reviewed.   Constitutional:       General: He is not in acute distress.     Appearance: He is well-developed. He is ill-appearing.   HENT:      Head: Normocephalic and atraumatic.      Right Ear: Tympanic membrane, ear canal and external ear normal.      Left Ear: Tympanic membrane, ear canal and external ear normal.      Nose: Rhinorrhea present.      Mouth/Throat:      Lips: Pink.      Mouth: Mucous membranes are moist.      Pharynx: Posterior oropharyngeal erythema and uvula swelling present. No oropharyngeal exudate.   Eyes:      Conjunctiva/sclera: Conjunctivae normal.   Cardiovascular:      Rate and Rhythm: Normal rate.   Pulmonary:      Effort: Pulmonary effort is normal. No respiratory distress.      Breath sounds: No stridor. Wheezing present. No rhonchi " or rales.   Musculoskeletal:         General: Normal range of motion.   Skin:     General: Skin is warm and dry.      Findings: No rash.   Neurological:      Mental Status: He is alert and oriented to person, place, and time.      GCS: GCS eye subscore is 4. GCS verbal subscore is 5. GCS motor subscore is 6.   Psychiatric:         Speech: Speech normal.         Behavior: Behavior normal.         Assessment/Plan:   1. Lower respiratory infection (e.g., bronchitis, pneumonia, pneumonitis, pulmonitis)  - methylPREDNISolone (MEDROL DOSEPAK) 4 MG Tablet Therapy Pack; Follow schedule on package instructions.  Dispense: 21 Tablet; Refill: 0  - albuterol 108 (90 Base) MCG/ACT Aero Soln inhalation aerosol; Inhale 1-2 Puffs every four hours as needed for Shortness of Breath.  Dispense: 8.5 g; Refill: 0  - doxycycline (VIBRAMYCIN) 100 MG Tab; Take 1 Tablet by mouth 2 times a day for 7 days.  Dispense: 14 Tablet; Refill: 0  - promethazine-dextromethorphan (PROMETHAZINE-DM) 6.25-15 MG/5ML syrup; Take 5 mL by mouth every 6 hours as needed for Cough for up to 7 days.  Dispense: 120 mL; Refill: 0    2. Uvulitis  - methylPREDNISolone (MEDROL DOSEPAK) 4 MG Tablet Therapy Pack; Follow schedule on package instructions.  Dispense: 21 Tablet; Refill: 0    Symptomatic care.  -Oral hydration and rest.   -Cough control: nonpharmacologic options for cough relief such as throat lozenges, hot tea, honey.  -Over the counter expectorant as directed; Guaifenesin (Mucinex).  -Tylenol  for pain and fever as directed.   -Warm salt water gargles.  -OTC Throat lozenges or spray (Cepacol).    Seek emergency medical care immediately for: Trouble breathing, persistent pain or pressure in the chest, confusion, inability to wake or stay awake, bluish lips or face, persistent tachycardia (fast heart rate), prolonged dizziness, persistent high grade fevers. Follow up for prolonged cough, persistent wheezing, persistent throat pain, difficulty swallowing,  persistent fevers, leg swelling, or any other concerns. Follow up with your Primary Care Provider.     -Discussed symptomatic care, and S&S for follow up. Non-labored respirations. Stable Vitals.    Differential diagnosis, natural history, supportive care, and indications for immediate follow-up discussed.

## 2025-02-20 ENCOUNTER — OFFICE VISIT (OUTPATIENT)
Dept: URGENT CARE | Facility: CLINIC | Age: 55
End: 2025-02-20
Payer: COMMERCIAL

## 2025-02-20 ENCOUNTER — RESULTS FOLLOW-UP (OUTPATIENT)
Dept: URGENT CARE | Facility: CLINIC | Age: 55
End: 2025-02-20

## 2025-02-20 VITALS
SYSTOLIC BLOOD PRESSURE: 140 MMHG | BODY MASS INDEX: 27.76 KG/M2 | HEART RATE: 95 BPM | DIASTOLIC BLOOD PRESSURE: 80 MMHG | RESPIRATION RATE: 18 BRPM | OXYGEN SATURATION: 95 % | HEIGHT: 69 IN | WEIGHT: 187.4 LBS | TEMPERATURE: 100.4 F

## 2025-02-20 DIAGNOSIS — R03.0 ELEVATED BLOOD PRESSURE READING IN OFFICE WITHOUT DIAGNOSIS OF HYPERTENSION: ICD-10-CM

## 2025-02-20 DIAGNOSIS — R05.1 ACUTE COUGH: ICD-10-CM

## 2025-02-20 DIAGNOSIS — J10.1 INFLUENZA A: ICD-10-CM

## 2025-02-20 DIAGNOSIS — R50.9 FEVER, UNSPECIFIED: ICD-10-CM

## 2025-02-20 DIAGNOSIS — R09.81 NASAL SINUS CONGESTION: ICD-10-CM

## 2025-02-20 LAB
FLUAV RNA SPEC QL NAA+PROBE: POSITIVE
FLUBV RNA SPEC QL NAA+PROBE: NEGATIVE
RSV RNA SPEC QL NAA+PROBE: NEGATIVE
S PYO DNA SPEC NAA+PROBE: NOT DETECTED
SARS-COV-2 RNA RESP QL NAA+PROBE: NEGATIVE

## 2025-02-20 PROCEDURE — 87651 STREP A DNA AMP PROBE: CPT

## 2025-02-20 PROCEDURE — 3077F SYST BP >= 140 MM HG: CPT

## 2025-02-20 PROCEDURE — 0241U POCT CEPHEID COV-2, FLU A/B, RSV - PCR: CPT

## 2025-02-20 PROCEDURE — 99213 OFFICE O/P EST LOW 20 MIN: CPT

## 2025-02-20 PROCEDURE — 3079F DIAST BP 80-89 MM HG: CPT

## 2025-02-20 RX ORDER — BENZONATATE 100 MG/1
100 CAPSULE ORAL 3 TIMES DAILY PRN
Qty: 30 CAPSULE | Refills: 0 | Status: SHIPPED | OUTPATIENT
Start: 2025-02-20 | End: 2025-03-02

## 2025-02-20 RX ORDER — FLUTICASONE PROPIONATE 50 MCG
2 SPRAY, SUSPENSION (ML) NASAL DAILY
Qty: 16 G | Refills: 0 | Status: SHIPPED | OUTPATIENT
Start: 2025-02-20 | End: 2025-03-06

## 2025-02-20 RX ORDER — PREDNISONE 10 MG/1
20 TABLET ORAL DAILY
Qty: 6 TABLET | Refills: 0 | Status: SHIPPED | OUTPATIENT
Start: 2025-02-20 | End: 2025-02-23

## 2025-02-20 RX ORDER — OSELTAMIVIR PHOSPHATE 75 MG/1
75 CAPSULE ORAL 2 TIMES DAILY
Qty: 10 CAPSULE | Refills: 0 | Status: SHIPPED | OUTPATIENT
Start: 2025-02-20

## 2025-02-20 RX ORDER — LIDOCAINE HYDROCHLORIDE 20 MG/ML
SOLUTION OROPHARYNGEAL
Qty: 100 ML | Refills: 0 | Status: SHIPPED | OUTPATIENT
Start: 2025-02-20

## 2025-02-20 ASSESSMENT — ENCOUNTER SYMPTOMS
NAUSEA: 0
SORE THROAT: 1
FEVER: 1
SHORTNESS OF BREATH: 0
COUGH: 1
BACK PAIN: 0
SINUS PAIN: 0
CHILLS: 1
EYE DISCHARGE: 0
HEMOPTYSIS: 0
STRIDOR: 0
MYALGIAS: 1
WHEEZING: 0
VOMITING: 0
SPUTUM PRODUCTION: 0

## 2025-02-20 NOTE — PROGRESS NOTES
Subjective:   Darrell Latham is a 54 y.o. male who presents for Cough (X 1 day, cough, headache, diarrhea, body aches, chest burning, fatigue.)      Patient presents with 1 day history of cough, body aches, fever, fatigue and nasal congestion.  Has a sick contact with his partner who has had similar symptoms.  He also endorses loss of taste as of last night.  He denies any shortness of breath, chest pain, wheezing, stridor, difficulty breathing    Cough  Associated symptoms include chills, a fever, myalgias and a sore throat. Pertinent negatives include no chest pain, ear pain, hemoptysis, rash, shortness of breath or wheezing.       Review of Systems   Constitutional:  Positive for chills, fever and malaise/fatigue.   HENT:  Positive for congestion and sore throat. Negative for ear discharge, ear pain and sinus pain.    Eyes:  Negative for discharge.   Respiratory:  Positive for cough. Negative for hemoptysis, sputum production, shortness of breath, wheezing and stridor.    Cardiovascular:  Negative for chest pain.   Gastrointestinal:  Negative for nausea and vomiting.   Genitourinary: Negative.    Musculoskeletal:  Positive for myalgias. Negative for back pain.   Skin:  Negative for rash.     Refer to [facility] for additional details.    During this visit, appropriate PPE was worn, and hand hygiene was performed.    PMH:  has a past medical history of Bowel habit changes, Chickenpox, Heart burn, HIV (human immunodeficiency virus infection) ([facility]), and Low testosterone in male.    He has no past medical history of Diabetes ([facility]), Hyperlipidemia, or Hypertension.    MEDS:   Current Outpatient Medications:     predniSONE (DELTASONE) 10 MG Tab, Take 2 Tablets by mouth every day for 3 days., Disp: 6 Tablet, Rfl: 0    lidocaine (XYLOCAINE) 2 % Solution, 5mL orally, may be applied as a swish and spit up to three times daily as needed for throat pain, Disp: 100 mL, Rfl: 0    benzonatate (TESSALON) 100 MG Cap, Take 1  Capsule by mouth 3 times a day as needed for Cough for up to 10 days., Disp: 30 Capsule, Rfl: 0    fluticasone (FLONASE ALLERGY RELIEF) 50 MCG/ACT nasal spray, Administer 2 Sprays into affected nostril(S) every day for 14 days., Disp: 16 g, Rfl: 0    oseltamivir (TAMIFLU) 75 MG Cap, Take 1 Capsule by mouth 2 times a day., Disp: 10 Capsule, Rfl: 0    methylPREDNISolone (MEDROL DOSEPAK) 4 MG Tablet Therapy Pack, Follow schedule on package instructions., Disp: 21 Tablet, Rfl: 0    albuterol 108 (90 Base) MCG/ACT Aero Soln inhalation aerosol, Inhale 1-2 Puffs every four hours as needed for Shortness of Breath., Disp: 8.5 g, Rfl: 0    Pseudoephedrine-APAP-DM (DAYQUIL PO), Take  by mouth., Disp: , Rfl:     tadalafil 20 MG tablet, Take 20 mg by mouth 1 time a day as needed., Disp: , Rfl:     promethazine-dextromethorphan (PROMETHAZINE-DM) 6.25-15 MG/5ML syrup, Take 5 mL by mouth every four hours as needed for Cough., Disp: 120 mL, Rfl: 0    temazepam (RESTORIL) 30 MG capsule, Take 30 mg by mouth at bedtime., Disp: , Rfl:     DOVATO  MG Tab, Take 1 Tablet by mouth every day., Disp: , Rfl:     buPROPion (WELLBUTRIN) 100 MG Tab, Take 1 Tablet by mouth every day., Disp: , Rfl:     ALPRAZolam (XANAX) 1 MG Tab, TAKE 1 TABLET BY MOUTH EVERY NIGHT AT BEDTIME F51.01, 30 DAYS, Disp: , Rfl: 2    PANTOPRAZOLE SODIUM PO, Take  by mouth every day., Disp: , Rfl:     testosterone cypionate (DEPO-TESTOSTERONE) 200 MG/ML Solution injection, Inject 0.25 mL into the shoulder, thigh, or buttocks one time., Disp: , Rfl:     benzocaine-menthol (CEPACOL EXTRA STRENGTH) 15-2.6 MG Lozenge lozenge, Dissolve 1 Lozenge in the mouth every 2 hours as needed (sore throat). (Patient not taking: Reported on 2/20/2025), Disp: 18 Lozenge, Rfl: 0    ALLERGIES:   Allergies   Allergen Reactions    Latex      rash    Seasonal Itching     Sinus pressure, itchy eyes, itchy throat     SURGHX:   Past Surgical History:   Procedure Laterality Date    FERNANDO BY  "LAPAROSCOPY  2/28/2018    Procedure: FERNANDO BY LAPAROSCOPY;  Surgeon: Bernadine James M.D.;  Location: SURGERY [facility];  Service: General     SOCHX:  reports that he has never smoked. He has never used smokeless tobacco. He reports that he does not currently use alcohol after a past usage of about 2.4 oz of alcohol per week. He reports that he does not use drugs.    FH: Per [facility] as applicable/pertinent.    Medications, Allergies, and current problem list reviewed today in Epic.     Objective:     BP (!) 140/80   Pulse 95   Temp 38 °C (100.4 °F) (Oral)   Resp 18   Ht 1.753 m (5' 9\")   Wt 85 kg (187 lb 6.4 oz)   SpO2 95%     Physical Exam  Constitutional:       General: He is not in acute distress.     Appearance: Normal appearance. He is ill-appearing. He is not toxic-appearing or diaphoretic.   HENT:      Head: Normocephalic.      Right Ear: Tympanic membrane, ear canal and external ear normal.      Left Ear: Tympanic membrane, ear canal and external ear normal.      Nose: Congestion and rhinorrhea present.      Mouth/Throat:      Mouth: Mucous membranes are dry.      Pharynx: Posterior oropharyngeal erythema present. No oropharyngeal exudate.   Eyes:      Conjunctiva/sclera: Conjunctivae normal.   Cardiovascular:      Rate and Rhythm: Normal rate.   Pulmonary:      Effort: Pulmonary effort is normal. No respiratory distress.      Breath sounds: Normal breath sounds. No stridor. No wheezing, rhonchi or rales.   Chest:      Chest wall: No tenderness.   Musculoskeletal:      Cervical back: Normal range of motion. No tenderness.   Lymphadenopathy:      Cervical: No cervical adenopathy.   Neurological:      Mental Status: He is alert.         Assessment/Plan:     Diagnosis and associated orders:     1. Influenza A  - predniSONE (DELTASONE) 10 MG Tab; Take 2 Tablets by mouth every day for 3 days.  Dispense: 6 Tablet; Refill: 0  - lidocaine (XYLOCAINE) 2 % Solution; 5mL orally, may be applied as a swish and " spit up to three times daily as needed for throat pain  Dispense: 100 mL; Refill: 0  - POCT CEPHEID COV-2, FLU A/B, RSV - PCR  - fluticasone (FLONASE ALLERGY RELIEF) 50 MCG/ACT nasal spray; Administer 2 Sprays into affected nostril(S) every day for 14 days.  Dispense: 16 g; Refill: 0  - oseltamivir (TAMIFLU) 75 MG Cap; Take 1 Capsule by mouth 2 times a day.  Dispense: 10 Capsule; Refill: 0    2. Acute cough  - predniSONE (DELTASONE) 10 MG Tab; Take 2 Tablets by mouth every day for 3 days.  Dispense: 6 Tablet; Refill: 0  - benzonatate (TESSALON) 100 MG Cap; Take 1 Capsule by mouth 3 times a day as needed for Cough for up to 10 days.  Dispense: 30 Capsule; Refill: 0  - POCT CEPHEID COV-2, FLU A/B, RSV - PCR    3. Fever, unspecified  - POCT CEPHEID GROUP A STREP - PCR  - POCT CEPHEID COV-2, FLU A/B, RSV - PCR    4. Nasal sinus congestion  - POCT CEPHEID GROUP A STREP - PCR  - POCT CEPHEID COV-2, FLU A/B, RSV - PCR  - fluticasone (FLONASE ALLERGY RELIEF) 50 MCG/ACT nasal spray; Administer 2 Sprays into affected nostril(S) every day for 14 days.  Dispense: 16 g; Refill: 0    5. Elevated blood pressure reading in office without diagnosis of hypertension     Comments/MDM:     Patient history and physical exam are consistent with acute cough with concomitant nasal congestion, fever, and sore throat.  Patient presents ill though nontoxic appearing in clinic no red flag symptoms seen on physical exam or endorsed by patient.  I discussed HPI and physical exams with patient, high suspicion for viral etiology given sick contact and symptoms endorsed.  Will do strep and viral testing  In clinic strep swab negative  In clinic viral swab positive for influenza A.  Patient is within window to receive treatment and would like Tamiflu.  Outpatient management will consist of copious fluids and electrolytes, stacked Tylenol and ibuprofen, prednisone for cough and inflammation, Flonase for nasal decongestion, viscous lidocaine gargle  with warm salt water gargles for throat pain, benzonatate as needed for cough, monitor symptoms  Follow up in 3-5 days if no improvement in symptoms           Differential diagnosis, natural history, supportive care, and indications for immediate follow-up discussed.    Advised the patient to follow-up with the primary care physician for recheck, reevaluation, and consideration of further management.    Please note that this dictation was created using voice recognition software. I have made a reasonable attempt to correct obvious errors, but I expect that there are errors of grammar and possibly content that I did not discover before finalizing the note.    This note was electronically signed by YISSEL Paulino

## 2025-04-08 NOTE — LETTER
Aman Stallworth M.D.  [facility]   [address] [place] [address]  Phone: [phone] - Fax: [phone]           Encounter Date: 6/8/2018  Provider: Aman Stallworth M.D.  Location of Care: [facility]      Patient:   Darrell Latham   MR Number: [id number]   YOB: 1970     PROGRESS NOTE:  Chief Complaint   Patient presents with   • Establish Care     REF BY MJ/ IN CHART/ NO PRIOR SS OR SLEEP PROVIDER/ DX: INSOMNIA       HPI:  The patient is a 47-year-old man who has had sleep issues for many years.  He is always been troubled by some degree of insomnia.  Sleep initiation is usually okay however, if he wakes up during the night he has difficulty getting back to sleep.  This is been going on for a number of years and has worsened recently.  At the present time if he is sleeping well he will usually get up at 4 AM.  He likes to workout at the gym early.  He then comes home for breakfast and to take care of the dog.  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  Dinner at 7.  He goes to bed anywhere from 8:30-9:30.  He falls asleep reasonably well but will wake up at midnight and then again wake up about 2:00 am and cannot get back to sleep.  At that time he often is concerned because he knows that he cannot sleep he will have a bad day and be excessively tired.  He is always had somewhat of a difficulty falling back to sleep.  However in the past he worked at home and was not concerned about meeting a schedule.  He had less anxiety surrounding his sleep pattern.    More recently he is feeling tired even if he slept okay.  He has awakened at times with gasping respirations.  He has been told that he snores.  He is not aware of being told about witnessed apneas.  He has never been diagnosed with obstructive sleep apnea.  There is no history to suggest narcolepsy or movement  disorder.    Past Medical History:   Diagnosis Date   • Bowel habit changes     constipation and diarrhea   • Chickenpox    • Heart burn    • HIV (human immunodeficiency virus infection) (HCC)     Diagnosed in [place], has never been on ART or had AIDS defining illness.   • Low testosterone in male        ROS:   Constitutional: Denies fevers, chills, night sweats, fatigue or weight loss  Eyes: Denies vision loss, pain, drainage, double vision  Ears, Nose, Throat: Denies earache, tinnitus, hoarseness  Cardiovascular: Denies chest pain, tightness, palpitations  Respiratory: Denies shortness of breath, sputum production, cough, hemoptysis  Sleep: See HPI  GI: Denies abdominal pain, nausea, vomiting, diarrhea  : Denies frequent urination, hematuria, painful urination  Musculoskeletal: Denies back pain, painful joints, sore muscles  Neurological: Denies headaches, seizures  Skin: Denies rashes, color changes  Psychiatric: Denies depression or thoughts of suicide  Hematologic: Denies bleeding tendency or clotting tendency  Allergic/Immunologic: Denies rhinitis, skin sensitivity    Social History     Social History   • Marital status: Single     Spouse name: N/A   • Number of children: N/A   • Years of education: N/A     Occupational History   • Not on file.     Social History Main Topics   • Smoking status: Never Smoker   • Smokeless tobacco: Never Used   • Alcohol use 2.4 oz/week     4 Standard drinks or equivalent per week      Comment: OCC.    • Drug use: No   • Sexual activity: Not Currently     Other Topics Concern   • Not on file     Social History Narrative   • No narrative on file     Latex and Seasonal  Current Outpatient Prescriptions on File Prior to Visit   Medication Sig Dispense Refill   • LORazepam (ATIVAN) 1 MG Tab 1 TABLET AT BEDTIME AS NEEDED ONCE A DAY ORALLY 30 DAYS  0   • Multiple Vitamins-Minerals (AIRBORNE PO) Take  by mouth.     • testosterone cypionate (DEPO-TESTOSTERONE) 200 MG/ML Solution  "injection 50 mg by Intramuscular route Once.     • therapeutic multivitamin-minerals (THERAGRAN-M) Tab Take 1 Tab by mouth every day.     • Non Formulary Request [facility] FOR FLONASE-PRN     • ondansetron (ZOFRAN ODT) 4 MG TABLET DISPERSIBLE Take 4 mg by mouth every 8 hours as needed for Nausea.       No current facility-administered medications on file prior to visit.      Blood pressure 110/80, pulse 68, temperature 36.8 °C (98.2 °F), resp. rate 16, height 1.753 m (5' 9\"), weight 76.2 kg (168 lb), SpO2 94 %.  Family History   Problem Relation Age of Onset   • Alzheimer's Disease Maternal Uncle    • Alzheimer's Disease Paternal Grandmother    • GI Father      CIrrhosis   • Cancer Maternal Aunt      Kidney   • Heart Disease Neg Hx        Physical Exam:    HEENT: PERRLA, EOMI, no scleral icterus, no nasal or oral lesions  Neck: No thyromegaly, no adenopathy, no bruits  Mallampatti: Grade II  Lungs: Equal breath sounds, no wheezes or crackles  Heart: Regular rate and rhythm, no gallops or murmurs  Abdomen: Soft, benign, no organomegaly  Extremities: No clubbing, cyanosis, or edema  Neurologic: Cranial nerve, motor, and sensory exam are normal    1. BUTCH (obstructive sleep apnea)    2. Primary insomnia        This man clearly has some element of insomnia that is primarily sleep maintenance.  We did discuss sleep hygiene and insomnia including anxiety surrounding the need to obtain sufficient sleep.  However in addition he has daytime somnolence even after sleeping at night and has a history of snoring and gasping that suggest a strong possibility of underlying obstructive sleep apnea.  We will obtain an overnight polysomnogram to make sure that he does not have severe obstructive sleep apnea.  Home study in someone with insomnia would not be very effective.  We will see him after his polysomnogram and go over results.  In addition to any treatment that may be directed toward obstructive sleep apnea he may " benefit from cognitive behavioral therapy concerning his insomnia.      Electronically signed by Aman Stallworth M.D.  on 06/08/18    No Recipients                   Male

## 2025-05-29 ENCOUNTER — OFFICE VISIT (OUTPATIENT)
Dept: URGENT CARE | Facility: CLINIC | Age: 55
End: 2025-05-29
Payer: COMMERCIAL

## 2025-05-29 VITALS
SYSTOLIC BLOOD PRESSURE: 124 MMHG | HEIGHT: 69 IN | DIASTOLIC BLOOD PRESSURE: 82 MMHG | TEMPERATURE: 97.9 F | RESPIRATION RATE: 20 BRPM | BODY MASS INDEX: 26.22 KG/M2 | HEART RATE: 80 BPM | WEIGHT: 177 LBS | OXYGEN SATURATION: 95 %

## 2025-05-29 DIAGNOSIS — K13.79: Primary | ICD-10-CM

## 2025-05-29 DIAGNOSIS — S00.512A: Primary | ICD-10-CM

## 2025-05-29 NOTE — PROGRESS NOTES
"Urgent Care Visit Note  [facility]    05/29/25    Darrell Latham     [address] [place] [address]     PCP: Susie Lawrence     Subjective:     Chief Complaint   Patient presents with    Medication Refill       HPI:  Darrell Latham is a 54 y.o. male who presents for concern for an oral cut and infection. Wears a snore guard mouthpiece for sleep apnea which helps him sleep a lot. However, reports it has rough edges and sometimes cuts his mouth. One other time in the past his mouth was cut from the guard and got infected and he took antibiotics which took it away quickly. Endorses pain just behind the right lower molar area.     No fever or chills.     ROS:  ROS     CURRENT MEDICATIONS:  Encounter Medications with Dispense Information[1]    ALLERGIES:   Allergies[2]    PROBLEM LIST:    does not have any pertinent problems on file.    Allergies, Medications, & Tobacco/Substance Use were reconciled by the Medical Assistant and reviewed by myself.     Objective:     /82   Pulse 80   Temp 36.6 °C (97.9 °F) (Temporal)   Resp 20   Ht 1.753 m (5' 9\")   Wt 80.3 kg (177 lb)   SpO2 95%   BMI 26.14 kg/m²     Physical Exam  Constitutional:       Appearance: Normal appearance.   HENT:      Head: Normocephalic and atraumatic.      Right Ear: External ear normal.      Left Ear: External ear normal.      Nose: Nose normal.      Mouth/Throat:        Comments: Mucosal irritation and small abrasion in the right lower area just posterior to the molars. No discharge or fluctuance. Total size of irritation less than 1 cm.   Eyes:      Extraocular Movements: Extraocular movements intact.      Pupils: Pupils are equal, round, and reactive to light.   Cardiovascular:      Rate and Rhythm: Normal rate.      Pulses: Normal pulses.   Pulmonary:      Effort: Pulmonary effort is normal.   Abdominal:      General: Abdomen is flat.   Skin:     General: Skin is warm.      Capillary Refill: Capillary refill " takes less than 2 seconds.   Neurological:      Mental Status: He is alert and oriented to person, place, and time.      Gait: Gait normal.   Psychiatric:         Mood and Affect: Mood normal.         Behavior: Behavior normal.           Lab Results/POC Test Results         Assessment/Plan:     Patient's history and physical exam consistent with:    Assessment & Plan  Infected abrasion of oral mucosa    Orders:    amoxicillin-clavulanate (AUGMENTIN) 875-125 MG Tab; Take 1 Tablet by mouth 2 times a day for 5 days.    Small area of skin breakdown with mucosal erythema, likely from skin breakdown from rough edges of snore guard. Will treat empirically with augmentin but educated on supportive  care otherwise. Given return precautions.     Discussed differential diagnosis, management options, risks/benefits, and alternatives to planned treatment. Pt expressed understanding and the treatment plan was agreed upon. Questions were encouraged and answered. Pt encouraged to return to urgent care as needed if new or worsening symptoms or if there is no improvement in condition. Pt educated in red flags and indications to immediately call 911 or present to the Emergency Department. Advised the patient to follow-up with the primary care physician for recheck, reevaluation, and further management.    I personally reviewed prior external notes and test results pertinent to today's visit. I have independently reviewed and interpreted all diagnostics ordered during this visit.    Please note that this dictation was created using voice recognition software. I have made a reasonable attempt to correct obvious errors, but I expect that there are errors of grammar and possibly content that I did not discover before finalizing the note.    This note was electronically signed by Marcos Torres MD               [1]   Current Outpatient Medications   Medication Sig Refill Last Dispense    albuterol 108 (90 Base) MCG/ACT Aero Soln inhalation  aerosol Inhale 1-2 Puffs every four hours as needed for Shortness of Breath. 0 Unknown (outside pharmacy)    ALPRAZolam (XANAX) 1 MG Tab TAKE 1 TABLET BY MOUTH EVERY NIGHT AT BEDTIME F51.01, 30 DAYS 2 Unknown (patient-reported)    amoxicillin-clavulanate (AUGMENTIN) 875-125 MG Tab Take 1 Tablet by mouth 2 times a day for 5 days. 0 Unknown (outside pharmacy)    benzocaine-menthol (CEPACOL EXTRA STRENGTH) 15-2.6 MG Lozenge lozenge Dissolve 1 Lozenge in the mouth every 2 hours as needed (sore throat). 0 Unknown (outside pharmacy)    buPROPion (WELLBUTRIN) 100 MG Tab Take 1 Tablet by mouth every day.  Unknown (patient-reported)    DOVATO  MG Tab Take 1 Tablet by mouth every day.  Unknown (patient-reported)    lidocaine (XYLOCAINE) 2 % Solution 5mL orally, may be applied as a swish and spit up to three times daily as needed for throat pain 0 Unknown (outside pharmacy)    methylPREDNISolone (MEDROL DOSEPAK) 4 MG Tablet Therapy Pack Follow schedule on package instructions. 0 Unknown (outside pharmacy)    oseltamivir (TAMIFLU) 75 MG Cap Take 1 Capsule by mouth 2 times a day. 0 Unknown (outside pharmacy)    PANTOPRAZOLE SODIUM PO Take  by mouth every day.  Unknown (patient-reported)    promethazine-dextromethorphan (PROMETHAZINE-DM) 6.25-15 MG/5ML syrup Take 5 mL by mouth every four hours as needed for Cough. 0 Unknown (outside pharmacy)    Pseudoephedrine-APAP-DM (DAYQUIL PO) Take  by mouth.  Unknown (patient-reported)    tadalafil 20 MG tablet Take 20 mg by mouth 1 time a day as needed.  Unknown (patient-reported)    temazepam (RESTORIL) 30 MG capsule Take 30 mg by mouth at bedtime.  Unknown (patient-reported)    testosterone cypionate (DEPO-TESTOSTERONE) 200 MG/ML Solution injection Inject 0.25 mL into the shoulder, thigh, or buttocks one time.  Unknown (patient-reported)   [2]   Allergies  Allergen Reactions    Latex      rash    Seasonal Itching     Sinus pressure, itchy eyes, itchy throat

## (undated) DEVICE — SUCTION INSTRUMENT YANKAUER BULBOUS TIP W/O VENT (50EA/CA)

## (undated) DEVICE — NEEDLE INSFL 120MM 14GA VRRS - (20/BX)

## (undated) DEVICE — ELECTRODE 5MM LHK LAPSCP STERILE DISP- MEGADYNE  (5/CA)

## (undated) DEVICE — BAG, SPONGE COUNT 50600

## (undated) DEVICE — TUBE CONNECTING SUCTION - CLEAR PLASTIC STERILE 72 IN (50EA/CA)

## (undated) DEVICE — DRESSING TRANSPARENT FILM TEGADERM 2.375 X 2.75"  (100EA/BX)"

## (undated) DEVICE — KIT ROOM DECONTAMINATION

## (undated) DEVICE — TROCAR 5X100 BLADED Z-THREAD - KII (6/BX)

## (undated) DEVICE — ELECTRODE DUAL RETURN W/ CORD - (50/PK)

## (undated) DEVICE — CANNULA W/SEAL 5X100 Z-THRE - ADED KII (12/BX)

## (undated) DEVICE — GOWN WARMING STANDARD FLEX - (30/CA)

## (undated) DEVICE — MASK ANESTHESIA ADULT  - (100/CA)

## (undated) DEVICE — SPONGE GAUZESTER. 2X2 4-PL - (2/PK 50PK/BX 30BX/CS)

## (undated) DEVICE — SUTURE 0 VICRYL PLUS UR-6 - 27 INCH (36/BX)

## (undated) DEVICE — WATER IRRIGATION STERILE 1000ML (12EA/CA)

## (undated) DEVICE — Device

## (undated) DEVICE — GLOVE BIOGEL PI INDICATOR SZ 8.0 SURGICAL PF LF -(50/BX 4BX/CA)

## (undated) DEVICE — GLOVE BIOGEL SZ 6.5 SURGICAL PF LTX (50PR/BX 4BX/CA)

## (undated) DEVICE — NEPTUNE 4 PORT MANIFOLD - (20/PK)

## (undated) DEVICE — SUTURE 4-0 MONOCRYL PLUS PS-2 - 27 INCH (36/BX)

## (undated) DEVICE — TUBE E-T HI-LO CUFF 7.5MM (10EA/PK)

## (undated) DEVICE — SUTURE GENERAL

## (undated) DEVICE — PROTECTOR ULNA NERVE - (36PR/CA)

## (undated) DEVICE — KIT ANESTHESIA W/CIRCUIT & 3/LT BAG W/FILTER (20EA/CA)

## (undated) DEVICE — CLIP MED LG INTNL HRZN TI ESCP - (20/BX)

## (undated) DEVICE — SENSOR SPO2 NEO LNCS ADHESIVE (20/BX) SEE USER NOTES

## (undated) DEVICE — CHLORAPREP 26 ML APPLICATOR - ORANGE TINT(25/CA)

## (undated) DEVICE — GLOVE, LITE (PAIR)

## (undated) DEVICE — TUBING INSUFFLATION - (10/BX)

## (undated) DEVICE — SPONGE GAUZESTER 4 X 4 4PLY - (128PK/CA)

## (undated) DEVICE — HEAD HOLDER JUNIOR/ADULT

## (undated) DEVICE — GLOVE BIOGEL PI ULTRATOUCH SZ 8.0 SURGICAL PF LF - (50/BX 4BX/CA)

## (undated) DEVICE — BAG RETRIEVAL 10ML (10EA/BX)

## (undated) DEVICE — LACTATED RINGERS INJ 1000 ML - (14EA/CA 60CA/PF)

## (undated) DEVICE — STERI STRIP COMPOUND BENZOIN - TINCTURE 0.6ML WITH APPLICATOR (40EA/BX)

## (undated) DEVICE — NEEDLE INSUFFLATION FOR STEP - (12/BX)

## (undated) DEVICE — HUMID-VENT HEAT AND MOISTURE EXCHANGE- (50/BX)

## (undated) DEVICE — TROCAR Z THREAD 12 X 100 - BLADED (6/BX)

## (undated) DEVICE — SODIUM CHL IRRIGATION 0.9% 1000ML (12EA/CA)

## (undated) DEVICE — GOWN SURGEONS X-LARGE - DISP. (30/CA)

## (undated) DEVICE — CANISTER SUCTION RIGID RED 1500CC (40EA/CA)

## (undated) DEVICE — ELECTRODE 850 FOAM ADHESIVE - HYDROGEL RADIOTRNSPRNT (50/PK)